# Patient Record
Sex: FEMALE | Race: WHITE | NOT HISPANIC OR LATINO
[De-identification: names, ages, dates, MRNs, and addresses within clinical notes are randomized per-mention and may not be internally consistent; named-entity substitution may affect disease eponyms.]

---

## 2021-03-24 ENCOUNTER — APPOINTMENT (OUTPATIENT)
Dept: OTOLARYNGOLOGY | Facility: CLINIC | Age: 71
End: 2021-03-24
Payer: MEDICARE

## 2021-03-24 VITALS
SYSTOLIC BLOOD PRESSURE: 145 MMHG | HEART RATE: 106 BPM | WEIGHT: 135 LBS | HEIGHT: 64 IN | DIASTOLIC BLOOD PRESSURE: 87 MMHG | BODY MASS INDEX: 23.05 KG/M2

## 2021-03-24 DIAGNOSIS — Z78.9 OTHER SPECIFIED HEALTH STATUS: ICD-10-CM

## 2021-03-24 DIAGNOSIS — Z82.49 FAMILY HISTORY OF ISCHEMIC HEART DISEASE AND OTHER DISEASES OF THE CIRCULATORY SYSTEM: ICD-10-CM

## 2021-03-24 DIAGNOSIS — Z80.1 FAMILY HISTORY OF MALIGNANT NEOPLASM OF TRACHEA, BRONCHUS AND LUNG: ICD-10-CM

## 2021-03-24 PROBLEM — Z00.00 ENCOUNTER FOR PREVENTIVE HEALTH EXAMINATION: Status: ACTIVE | Noted: 2021-03-24

## 2021-03-24 PROCEDURE — 99203 OFFICE O/P NEW LOW 30 MIN: CPT

## 2021-03-24 NOTE — REASON FOR VISIT
[Initial Evaluation] : an initial evaluation for [Other: _____] : [unfilled] [FreeTextEntry2] : left buccal mucosa

## 2021-03-24 NOTE — CONSULT LETTER
[Dear  ___] : Dear  [unfilled], [Consult Letter:] : I had the pleasure of evaluating your patient, [unfilled]. [Please see my note below.] : Please see my note below. [Sincerely,] : Sincerely, [FreeTextEntry2] : Nighat Ramirez DDS ([place], [place]) [FreeTextEntry3] : Yonis Hernández MD, FACS\par \par    [facility]\par Associate Chair\par    Department of Otolaryngology\par \par Professor\par Otolaryngology & Molecular Medicine\par [facility] [employer]\par

## 2021-03-24 NOTE — HISTORY OF PRESENT ILLNESS
[de-identified] : 70 year old female referred by Dr Nighat Ramirez for left buccal mucosa lesion\par hx left posterior buccal mucosa biopsy in 2016:, hyperkeratosis: Leukoplakia \par Denies pain, dysphagia, odynophagia, fevers, chills, recent throat infections. Weight has been stable. \par \par Pt feels present lesion L gum and cheek.  no bleeding or pain.  feels is inflammed. has been an issue in this specific area for quite some time that feels is worsening.

## 2021-03-24 NOTE — PHYSICAL EXAM
[FreeTextEntry1] : there is a sl raised red and white lesion L post buccal mandib gingiva w extesion of white plaque like lseion w ext across sulcus to invo,ve L post buccal mucosa w and additional denuded area mucosa posterior to this. None are fiable [Normal] : no rashes

## 2021-03-29 ENCOUNTER — OUTPATIENT (OUTPATIENT)
Dept: OUTPATIENT SERVICES | Facility: HOSPITAL | Age: 71
LOS: 1 days | End: 2021-03-29
Payer: MEDICARE

## 2021-03-29 VITALS
TEMPERATURE: 97 F | DIASTOLIC BLOOD PRESSURE: 80 MMHG | RESPIRATION RATE: 16 BRPM | HEART RATE: 87 BPM | OXYGEN SATURATION: 99 % | HEIGHT: 64 IN | SYSTOLIC BLOOD PRESSURE: 130 MMHG | WEIGHT: 138.01 LBS

## 2021-03-29 DIAGNOSIS — K13.70 UNSPECIFIED LESIONS OF ORAL MUCOSA: ICD-10-CM

## 2021-03-29 DIAGNOSIS — Z98.890 OTHER SPECIFIED POSTPROCEDURAL STATES: Chronic | ICD-10-CM

## 2021-03-29 DIAGNOSIS — Z01.818 ENCOUNTER FOR OTHER PREPROCEDURAL EXAMINATION: ICD-10-CM

## 2021-03-29 LAB
ANION GAP SERPL CALC-SCNC: 10 MMOL/L — SIGNIFICANT CHANGE UP (ref 7–14)
BUN SERPL-MCNC: 13 MG/DL — SIGNIFICANT CHANGE UP (ref 7–23)
CALCIUM SERPL-MCNC: 9.2 MG/DL — SIGNIFICANT CHANGE UP (ref 8.4–10.5)
CHLORIDE SERPL-SCNC: 104 MMOL/L — SIGNIFICANT CHANGE UP (ref 98–107)
CO2 SERPL-SCNC: 27 MMOL/L — SIGNIFICANT CHANGE UP (ref 22–31)
CREAT SERPL-MCNC: 0.77 MG/DL — SIGNIFICANT CHANGE UP (ref 0.5–1.3)
GLUCOSE SERPL-MCNC: 91 MG/DL — SIGNIFICANT CHANGE UP (ref 70–99)
HCT VFR BLD CALC: 44.1 % — SIGNIFICANT CHANGE UP (ref 34.5–45)
HGB BLD-MCNC: 14.6 G/DL — SIGNIFICANT CHANGE UP (ref 11.5–15.5)
MCHC RBC-ENTMCNC: 31.9 PG — SIGNIFICANT CHANGE UP (ref 27–34)
MCHC RBC-ENTMCNC: 33.1 GM/DL — SIGNIFICANT CHANGE UP (ref 32–36)
MCV RBC AUTO: 96.3 FL — SIGNIFICANT CHANGE UP (ref 80–100)
NRBC # BLD: 0 /100 WBCS — SIGNIFICANT CHANGE UP
NRBC # FLD: 0 K/UL — SIGNIFICANT CHANGE UP
PLATELET # BLD AUTO: 220 K/UL — SIGNIFICANT CHANGE UP (ref 150–400)
POTASSIUM SERPL-MCNC: 4.4 MMOL/L — SIGNIFICANT CHANGE UP (ref 3.5–5.3)
POTASSIUM SERPL-SCNC: 4.4 MMOL/L — SIGNIFICANT CHANGE UP (ref 3.5–5.3)
RBC # BLD: 4.58 M/UL — SIGNIFICANT CHANGE UP (ref 3.8–5.2)
RBC # FLD: 12 % — SIGNIFICANT CHANGE UP (ref 10.3–14.5)
SODIUM SERPL-SCNC: 141 MMOL/L — SIGNIFICANT CHANGE UP (ref 135–145)
WBC # BLD: 3.36 K/UL — LOW (ref 3.8–10.5)
WBC # FLD AUTO: 3.36 K/UL — LOW (ref 3.8–10.5)

## 2021-03-29 PROCEDURE — 93010 ELECTROCARDIOGRAM REPORT: CPT

## 2021-03-29 RX ORDER — SODIUM CHLORIDE 9 MG/ML
1000 INJECTION, SOLUTION INTRAVENOUS
Refills: 0 | Status: DISCONTINUED | OUTPATIENT
Start: 2021-04-02 | End: 2021-04-17

## 2021-03-29 RX ORDER — SODIUM CHLORIDE 9 MG/ML
3 INJECTION INTRAMUSCULAR; INTRAVENOUS; SUBCUTANEOUS ONCE
Refills: 0 | Status: DISCONTINUED | OUTPATIENT
Start: 2021-04-02 | End: 2021-04-17

## 2021-03-29 NOTE — H&P PST ADULT - NSICDXPROBLEM_GEN_ALL_CORE_FT
PROBLEM DIAGNOSES  Problem: Unspecified lesions of oral mucosa  Assessment and Plan: Pt scheduled for surgery on 4/2/2021.  Pre-op instructions provided. Pt verbalized understanding.   Pepcid provided for GI prophylaxis.   Pt states she is already scheduled for preop COVID testing.   Pt is going for medical clearance per surgeon's request.   Will request copy of last echo.

## 2021-03-29 NOTE — H&P PST ADULT - NSICDXFAMILYHX_GEN_ALL_CORE_FT
FAMILY HISTORY:  Father  Still living? Unknown  FH: lung cancer, Age at diagnosis: Age Unknown    Sibling  Still living? Unknown  FH: lung cancer, Age at diagnosis: Age Unknown

## 2021-03-29 NOTE — H&P PST ADULT - HISTORY OF PRESENT ILLNESS
70 year old female with c/o inflammation on her left lower gums which she has had for several years, had biopsy in 2016 but states it has gotten worse over the past year. Pt presents today for presurgical evaluation for ... 70 year old female with c/o inflammation on her left lower gums which she has had for several years, had biopsy in 2016 but states it has gotten worse over the past year. Pt presents today for presurgical evaluation for Biopsy Vestibule of Mouth.

## 2021-03-29 NOTE — H&P PST ADULT - NSICDXPASTMEDICALHX_GEN_ALL_CORE_FT
PAST MEDICAL HISTORY:  Nonrheumatic aortic valve disorder      PAST MEDICAL HISTORY:  H/O varicose veins     Nonrheumatic aortic valve disorder

## 2021-03-30 ENCOUNTER — APPOINTMENT (OUTPATIENT)
Dept: DISASTER EMERGENCY | Facility: CLINIC | Age: 71
End: 2021-03-30

## 2021-03-30 NOTE — H&P PST ADULT - NS PRO FEM REPRO HEALTH SCREEN
Headache    A headache is pain or discomfort felt around the head or neck area. The specific cause of a headache may not be found as there are many types including tension headaches, migraine headaches, and cluster headaches. Watch your condition for any changes. Things you can do to manage your pain include taking over the counter and prescription medications as instructed by your health care provider, lying down in a dark quiet room, limiting stress, getting regular sleep, and refraining from alcohol and tobacco products.    SEEK IMMEDIATE MEDICAL CARE IF YOU HAVE ANY OF THE FOLLOWING SYMPTOMS: fever, vomiting, stiff neck, loss of vision, problems with speech, muscle weakness, loss of balance, trouble walking, passing out, or confusion. mammogram

## 2021-03-31 LAB — SARS-COV-2 N GENE NPH QL NAA+PROBE: NOT DETECTED

## 2021-04-01 ENCOUNTER — TRANSCRIPTION ENCOUNTER (OUTPATIENT)
Age: 71
End: 2021-04-01

## 2021-04-02 ENCOUNTER — RESULT REVIEW (OUTPATIENT)
Age: 71
End: 2021-04-02

## 2021-04-02 ENCOUNTER — APPOINTMENT (OUTPATIENT)
Dept: OTOLARYNGOLOGY | Facility: HOSPITAL | Age: 71
End: 2021-04-02

## 2021-04-02 ENCOUNTER — OUTPATIENT (OUTPATIENT)
Dept: OUTPATIENT SERVICES | Facility: HOSPITAL | Age: 71
LOS: 1 days | Discharge: HOME | End: 2021-04-02
Payer: MEDICARE

## 2021-04-02 VITALS
HEART RATE: 98 BPM | TEMPERATURE: 99 F | HEIGHT: 64 IN | DIASTOLIC BLOOD PRESSURE: 77 MMHG | OXYGEN SATURATION: 100 % | WEIGHT: 138.01 LBS | SYSTOLIC BLOOD PRESSURE: 154 MMHG | RESPIRATION RATE: 16 BRPM

## 2021-04-02 VITALS
RESPIRATION RATE: 16 BRPM | DIASTOLIC BLOOD PRESSURE: 61 MMHG | OXYGEN SATURATION: 100 % | SYSTOLIC BLOOD PRESSURE: 105 MMHG | HEART RATE: 90 BPM

## 2021-04-02 DIAGNOSIS — Z98.890 OTHER SPECIFIED POSTPROCEDURAL STATES: Chronic | ICD-10-CM

## 2021-04-02 DIAGNOSIS — K13.70 UNSPECIFIED LESIONS OF ORAL MUCOSA: ICD-10-CM

## 2021-04-02 PROCEDURE — 88305 TISSUE EXAM BY PATHOLOGIST: CPT | Mod: 26

## 2021-04-02 PROCEDURE — 40808 BIOPSY OF MOUTH LESION: CPT | Mod: GC,LT

## 2021-04-02 RX ORDER — ONDANSETRON 8 MG/1
4 TABLET, FILM COATED ORAL ONCE
Refills: 0 | Status: DISCONTINUED | OUTPATIENT
Start: 2021-04-02 | End: 2021-04-17

## 2021-04-02 RX ORDER — FENTANYL CITRATE 50 UG/ML
50 INJECTION INTRAVENOUS
Refills: 0 | Status: DISCONTINUED | OUTPATIENT
Start: 2021-04-02 | End: 2021-04-02

## 2021-04-02 RX ORDER — OXYCODONE HYDROCHLORIDE 5 MG/1
5 TABLET ORAL ONCE
Refills: 0 | Status: DISCONTINUED | OUTPATIENT
Start: 2021-04-02 | End: 2021-04-02

## 2021-04-02 RX ORDER — OXYCODONE HYDROCHLORIDE 5 MG/1
5 TABLET ORAL EVERY 6 HOURS
Refills: 0 | Status: DISCONTINUED | OUTPATIENT
Start: 2021-04-02 | End: 2021-04-02

## 2021-04-02 RX ORDER — ACETAMINOPHEN 500 MG
650 TABLET ORAL EVERY 6 HOURS
Refills: 0 | Status: DISCONTINUED | OUTPATIENT
Start: 2021-04-02 | End: 2021-04-17

## 2021-04-02 RX ORDER — OXYCODONE AND ACETAMINOPHEN 5; 325 MG/1; MG/1
1 TABLET ORAL
Qty: 10 | Refills: 0
Start: 2021-04-02 | End: 2021-04-06

## 2021-04-02 RX ORDER — ONDANSETRON 8 MG/1
4 TABLET, FILM COATED ORAL EVERY 4 HOURS
Refills: 0 | Status: DISCONTINUED | OUTPATIENT
Start: 2021-04-02 | End: 2021-04-17

## 2021-04-02 RX ORDER — HYDROMORPHONE HYDROCHLORIDE 2 MG/ML
0.5 INJECTION INTRAMUSCULAR; INTRAVENOUS; SUBCUTANEOUS EVERY 4 HOURS
Refills: 0 | Status: DISCONTINUED | OUTPATIENT
Start: 2021-04-02 | End: 2021-04-02

## 2021-04-02 RX ORDER — FENTANYL CITRATE 50 UG/ML
25 INJECTION INTRAVENOUS
Refills: 0 | Status: DISCONTINUED | OUTPATIENT
Start: 2021-04-02 | End: 2021-04-02

## 2021-04-02 RX ORDER — OXYCODONE HYDROCHLORIDE 5 MG/1
10 TABLET ORAL ONCE
Refills: 0 | Status: DISCONTINUED | OUTPATIENT
Start: 2021-04-02 | End: 2021-04-02

## 2021-04-02 NOTE — ASU DISCHARGE PLAN (ADULT/PEDIATRIC) - CARE PROVIDER_API CALL
Yonis Hernández)  [facility]; Otolaryngology  [address] [place] [id number]  Phone: [phone]  Fax: [phone]  Follow Up Time:

## 2021-04-02 NOTE — ASU DISCHARGE PLAN (ADULT/PEDIATRIC) - ASU DC SPECIAL INSTRUCTIONSFT
Activity: No heavy lifting or strenuous activity. Walk as tolerated. Physical therapy per routine. Sutures in mouth are dissolvable.   Follow up 1 - 2 weeks after discharge. Call office for appointment.  Office: [phone].  Call office for fever >101.5 or redness or drainage from wound.

## 2021-04-02 NOTE — ASU DISCHARGE PLAN (ADULT/PEDIATRIC) - NURSING INSTRUCTIONS
You received IV Tylenol for pain management at 4:00PM. Please DO NOT take any Tylenol (Acetaminophen) containing products, such as Vicodin, Percocet, Excedrin, and cold medications for the next 6 hours (until 10:00 PM). DO NOT TAKE MORE THAN 3000 MG OF TYLENOL in a 24 hour period.

## 2021-04-05 PROBLEM — Z86.79 PERSONAL HISTORY OF OTHER DISEASES OF THE CIRCULATORY SYSTEM: Chronic | Status: ACTIVE | Noted: 2021-03-29

## 2021-04-07 LAB — SURGICAL PATHOLOGY STUDY: SIGNIFICANT CHANGE UP

## 2021-04-08 ENCOUNTER — APPOINTMENT (OUTPATIENT)
Dept: OTOLARYNGOLOGY | Facility: CLINIC | Age: 71
End: 2021-04-08

## 2021-04-09 ENCOUNTER — APPOINTMENT (OUTPATIENT)
Dept: CT IMAGING | Facility: CLINIC | Age: 71
End: 2021-04-09
Payer: MEDICARE

## 2021-04-09 ENCOUNTER — OUTPATIENT (OUTPATIENT)
Dept: OUTPATIENT SERVICES | Facility: HOSPITAL | Age: 71
LOS: 1 days | End: 2021-04-09
Payer: MEDICARE

## 2021-04-09 DIAGNOSIS — K13.70 UNSPECIFIED LESIONS OF ORAL MUCOSA: ICD-10-CM

## 2021-04-09 DIAGNOSIS — Z98.890 OTHER SPECIFIED POSTPROCEDURAL STATES: Chronic | ICD-10-CM

## 2021-04-09 PROCEDURE — 70491 CT SOFT TISSUE NECK W/DYE: CPT

## 2021-04-09 PROCEDURE — 70491 CT SOFT TISSUE NECK W/DYE: CPT | Mod: 26,MH

## 2021-04-13 ENCOUNTER — APPOINTMENT (OUTPATIENT)
Dept: OTOLARYNGOLOGY | Facility: CLINIC | Age: 71
End: 2021-04-13
Payer: MEDICARE

## 2021-04-13 VITALS
SYSTOLIC BLOOD PRESSURE: 166 MMHG | HEART RATE: 116 BPM | HEIGHT: 64 IN | WEIGHT: 135 LBS | DIASTOLIC BLOOD PRESSURE: 77 MMHG | BODY MASS INDEX: 23.05 KG/M2

## 2021-04-13 PROCEDURE — 99215 OFFICE O/P EST HI 40 MIN: CPT

## 2021-04-13 PROCEDURE — 99072 ADDL SUPL MATRL&STAF TM PHE: CPT

## 2021-04-13 NOTE — CONSULT LETTER
[Dear  ___] : Dear  [unfilled], [Courtesy Letter:] : I had the pleasure of seeing your patient, [unfilled], in my office today. [Please see my note below.] : Please see my note below. [Sincerely,] : Sincerely, [FreeTextEntry2] : Nighat Ramirez DDS ([place], [place]) [FreeTextEntry3] : Yonis Hernández MD, FACS\par \par    [facility]\par Associate Chair\par    Department of Otolaryngology\par \par Professor\par Otolaryngology & Molecular Medicine\par [facility] [employer]\par

## 2021-04-13 NOTE — REASON FOR VISIT
[Subsequent Evaluation] : a subsequent evaluation for [FreeTextEntry2] : s/p left mandibular and buccal biopsies on 4/2/2021

## 2021-04-13 NOTE — HISTORY OF PRESENT ILLNESS
[de-identified] : Ms. Estrella is s/p left mandibular and buccal biopsies on 4/2/2021. 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

## 2021-04-14 NOTE — H&P PST ADULT - GASTROINTESTINAL
Assessment/Plan:      Diagnoses and all orders for this visit:    Bartholin gland cyst    Other orders  -     Discontinue: terbinafine (LamISIL) 250 mg tablet; Take 250 mg by mouth daily      -reviewed with patient right-sided bartholin cyst   Reviewed conservative measures, I&D  Patient desires I&D today-see procedure note    RTO annual exam     Subjective:     Patient ID: Francesca Batista is a 42 y o  female  HPI  here with complaints of vaginal lump for approximately 4-5 days  Complains of slight pain in pressure in that area  Aggravating factors include wearing clothing, sitting, toileting  Denies alleviating factors  Has not tried any over-the-counter remedies  Last Pap smear 16 resulted NILM HR HPV(-)  Last mammogram 21 resulted BI-RADS 3 left/BI-RADS 1 right  Mirena IUD placed 17 effective 2022 (used for AUB)    Review of Systems   Constitutional: Negative for chills, fatigue and fever  Respiratory: Negative  Cardiovascular: Negative  Objective:     Physical Exam  Constitutional:       Appearance: Normal appearance  Cardiovascular:      Rate and Rhythm: Normal rate and regular rhythm  Pulmonary:      Effort: Pulmonary effort is normal       Breath sounds: Normal breath sounds  Neurological:      Mental Status: She is alert and oriented to person, place, and time     Psychiatric:         Mood and Affect: Mood normal          Behavior: Behavior normal  details… detailed exam

## 2021-04-16 ENCOUNTER — OUTPATIENT (OUTPATIENT)
Dept: OUTPATIENT SERVICES | Facility: HOSPITAL | Age: 71
LOS: 1 days | End: 2021-04-16
Payer: MEDICARE

## 2021-04-16 ENCOUNTER — APPOINTMENT (OUTPATIENT)
Dept: CT IMAGING | Facility: CLINIC | Age: 71
End: 2021-04-16
Payer: MEDICARE

## 2021-04-16 DIAGNOSIS — Z98.890 OTHER SPECIFIED POSTPROCEDURAL STATES: Chronic | ICD-10-CM

## 2021-04-16 DIAGNOSIS — C03.9 MALIGNANT NEOPLASM OF GUM, UNSPECIFIED: ICD-10-CM

## 2021-04-16 PROCEDURE — 71250 CT THORAX DX C-: CPT

## 2021-04-16 PROCEDURE — 71250 CT THORAX DX C-: CPT | Mod: 26,MH

## 2021-04-20 ENCOUNTER — FORM ENCOUNTER (OUTPATIENT)
Age: 71
End: 2021-04-20

## 2021-04-20 ENCOUNTER — APPOINTMENT (OUTPATIENT)
Dept: OTOLARYNGOLOGY | Facility: CLINIC | Age: 71
End: 2021-04-20

## 2021-04-21 ENCOUNTER — NON-APPOINTMENT (OUTPATIENT)
Age: 71
End: 2021-04-21

## 2021-04-23 ENCOUNTER — OUTPATIENT (OUTPATIENT)
Dept: OUTPATIENT SERVICES | Facility: HOSPITAL | Age: 71
LOS: 1 days | End: 2021-04-23
Payer: MEDICARE

## 2021-04-23 VITALS
DIASTOLIC BLOOD PRESSURE: 86 MMHG | SYSTOLIC BLOOD PRESSURE: 132 MMHG | WEIGHT: 134.04 LBS | RESPIRATION RATE: 14 BRPM | TEMPERATURE: 98 F | OXYGEN SATURATION: 97 % | HEART RATE: 72 BPM | HEIGHT: 64 IN

## 2021-04-23 DIAGNOSIS — Z98.890 OTHER SPECIFIED POSTPROCEDURAL STATES: Chronic | ICD-10-CM

## 2021-04-23 DIAGNOSIS — C79.89 SECONDARY MALIGNANT NEOPLASM OF OTHER SPECIFIED SITES: ICD-10-CM

## 2021-04-23 DIAGNOSIS — C03.9 MALIGNANT NEOPLASM OF GUM, UNSPECIFIED: ICD-10-CM

## 2021-04-23 DIAGNOSIS — C03.9 MALIGNANT NEOPLASM OF GUM, UNSPECIFIED: Chronic | ICD-10-CM

## 2021-04-23 LAB
ALBUMIN SERPL ELPH-MCNC: 4.4 G/DL — SIGNIFICANT CHANGE UP (ref 3.3–5)
ALP SERPL-CCNC: 67 U/L — SIGNIFICANT CHANGE UP (ref 40–120)
ALT FLD-CCNC: 16 U/L — SIGNIFICANT CHANGE UP (ref 4–33)
ANION GAP SERPL CALC-SCNC: 9 MMOL/L — SIGNIFICANT CHANGE UP (ref 7–14)
AST SERPL-CCNC: 20 U/L — SIGNIFICANT CHANGE UP (ref 4–32)
BILIRUB SERPL-MCNC: 0.6 MG/DL — SIGNIFICANT CHANGE UP (ref 0.2–1.2)
BLD GP AB SCN SERPL QL: NEGATIVE — SIGNIFICANT CHANGE UP
BUN SERPL-MCNC: 13 MG/DL — SIGNIFICANT CHANGE UP (ref 7–23)
CALCIUM SERPL-MCNC: 9.9 MG/DL — SIGNIFICANT CHANGE UP (ref 8.4–10.5)
CHLORIDE SERPL-SCNC: 105 MMOL/L — SIGNIFICANT CHANGE UP (ref 98–107)
CO2 SERPL-SCNC: 27 MMOL/L — SIGNIFICANT CHANGE UP (ref 22–31)
CREAT SERPL-MCNC: 0.81 MG/DL — SIGNIFICANT CHANGE UP (ref 0.5–1.3)
GLUCOSE SERPL-MCNC: 79 MG/DL — SIGNIFICANT CHANGE UP (ref 70–99)
HCT VFR BLD CALC: 45.3 % — HIGH (ref 34.5–45)
HGB BLD-MCNC: 15.1 G/DL — SIGNIFICANT CHANGE UP (ref 11.5–15.5)
MCHC RBC-ENTMCNC: 31.8 PG — SIGNIFICANT CHANGE UP (ref 27–34)
MCHC RBC-ENTMCNC: 33.3 GM/DL — SIGNIFICANT CHANGE UP (ref 32–36)
MCV RBC AUTO: 95.4 FL — SIGNIFICANT CHANGE UP (ref 80–100)
NRBC # BLD: 0 /100 WBCS — SIGNIFICANT CHANGE UP
NRBC # FLD: 0 K/UL — SIGNIFICANT CHANGE UP
PLATELET # BLD AUTO: 240 K/UL — SIGNIFICANT CHANGE UP (ref 150–400)
POTASSIUM SERPL-MCNC: 5.1 MMOL/L — SIGNIFICANT CHANGE UP (ref 3.5–5.3)
POTASSIUM SERPL-SCNC: 5.1 MMOL/L — SIGNIFICANT CHANGE UP (ref 3.5–5.3)
PROT SERPL-MCNC: 7 G/DL — SIGNIFICANT CHANGE UP (ref 6–8.3)
RBC # BLD: 4.75 M/UL — SIGNIFICANT CHANGE UP (ref 3.8–5.2)
RBC # FLD: 12 % — SIGNIFICANT CHANGE UP (ref 10.3–14.5)
RH IG SCN BLD-IMP: POSITIVE — SIGNIFICANT CHANGE UP
SODIUM SERPL-SCNC: 141 MMOL/L — SIGNIFICANT CHANGE UP (ref 135–145)
WBC # BLD: 3.59 K/UL — LOW (ref 3.8–10.5)
WBC # FLD AUTO: 3.59 K/UL — LOW (ref 3.8–10.5)

## 2021-04-23 PROCEDURE — 93010 ELECTROCARDIOGRAM REPORT: CPT

## 2021-04-23 RX ORDER — SODIUM CHLORIDE 9 MG/ML
1000 INJECTION, SOLUTION INTRAVENOUS
Refills: 0 | Status: DISCONTINUED | OUTPATIENT
Start: 2021-05-06 | End: 2021-05-08

## 2021-04-23 NOTE — H&P PST ADULT - MARITAL STATUS
2 children, mother  84y/o heart problems, father  51y/o lung cancer, 4 siblings 1 brother  70y/o lung cancer/

## 2021-04-23 NOTE — H&P PST ADULT - SKIN/BREAST
What Is The Reason For Today's Visit?: Full Body Skin Examination What Is The Reason For Today's Visit? (Being Monitored For X): the development of a new lesion details…

## 2021-04-23 NOTE — H&P PST ADULT - MUSCULOSKELETAL
detailed exam ROM intact/no joint swelling/no joint erythema/no joint warmth/no calf tenderness/normal strength details… ROM intact/no joint swelling/normal strength

## 2021-04-23 NOTE — H&P PST ADULT - RS GEN PE MLT RESP DETAILS PC
airway patent/breath sounds equal/good air movement/respirations non-labored/clear to auscultation bilaterally breath sounds equal/good air movement/respirations non-labored/clear to auscultation bilaterally/no chest wall tenderness/no intercostal retractions/no rales/no rhonchi/no wheezes

## 2021-04-23 NOTE — H&P PST ADULT - NSICDXPASTMEDICALHX_GEN_ALL_CORE_FT
PAST MEDICAL HISTORY:  H/O varicose veins     Nonrheumatic aortic valve disorder      PAST MEDICAL HISTORY:  Aortic insufficiency     H/O varicose veins     Malignant neoplasm of gingiva

## 2021-04-23 NOTE — H&P PST ADULT - HISTORY OF PRESENT ILLNESS
70 year old female with c/o inflammation on her left lower gums which she has had for several years, had biopsy in 2016 but states it has gotten worse over the past year. Pt presents today for presurgical evaluation for Biopsy Vestibule of Mouth.  69y/o female scheduled for glossectomy left neck dissection tracheostomy, isabella marginal reconstruction plate possible implant placment X3 on 5/6/2021.  Pt states, "has inflammation of left lower gum for several yrs, worsening over time, denies pain.   S/p biopsy 3/2021 positive for malignancy."

## 2021-04-23 NOTE — H&P PST ADULT - NSICDXPROBLEM_GEN_ALL_CORE_FT
PROBLEM DIAGNOSES  Problem: Malignant neoplasm of gum  Assessment and Plan: Pt scheduled for glossectomy left neck dissection tracheostomy, mell marginal mandibulectomy reconstruction plate possible implant on 5/6/2021.  labs done results pending, ekg done.  Pt scheduled for preop covid testing.  Preop teaching done, pt able to verbalize understanding.   Pt scheduled for medical eval as per surgeon, pst will also request

## 2021-04-26 ENCOUNTER — APPOINTMENT (OUTPATIENT)
Dept: OTOLARYNGOLOGY | Facility: CLINIC | Age: 71
End: 2021-04-26
Payer: MEDICARE

## 2021-04-26 VITALS
WEIGHT: 138 LBS | TEMPERATURE: 97.8 F | HEART RATE: 77 BPM | DIASTOLIC BLOOD PRESSURE: 83 MMHG | SYSTOLIC BLOOD PRESSURE: 148 MMHG | OXYGEN SATURATION: 99 % | BODY MASS INDEX: 23.56 KG/M2 | HEIGHT: 64 IN

## 2021-04-26 DIAGNOSIS — J39.2 OTHER DISEASES OF PHARYNX: ICD-10-CM

## 2021-04-26 PROCEDURE — 31575 DIAGNOSTIC LARYNGOSCOPY: CPT

## 2021-04-26 PROCEDURE — 99072 ADDL SUPL MATRL&STAF TM PHE: CPT

## 2021-04-26 PROCEDURE — 99215 OFFICE O/P EST HI 40 MIN: CPT | Mod: 25

## 2021-04-26 NOTE — PROCEDURE
[Lesion] : lesion identified by mirror examination needing further evaluation [None] : none [Flexible Endoscope] : examined with the flexible endoscope [Serial Number: ___] : Serial Number: [unfilled] [de-identified] : No lesions in the NPx, HPx or larynx.  Exam of the OPx with asymmetric prominence of the L. BOT - an obvious cyst is not seen - with what seems to be lingual tonsillar hypertrophy.  VC are mobile, airway patent.\par

## 2021-04-26 NOTE — HISTORY OF PRESENT ILLNESS
[de-identified] : Ms. Cary  is refer by Dr. Hernández with newly diagnosis of left mandibular/buccal biopsies showed invasive SCCa . pt is here to discuss reconstruction surgery. pt already saw Dr. Vega. pt has the lesion about one year and now start irritation. no bleeding, no pain.\par never smoke.

## 2021-04-26 NOTE — CONSULT LETTER
[Dear  ___] : Dear  [unfilled], [Courtesy Letter:] : I had the pleasure of seeing your patient, [unfilled], in my office today. [Please see my note below.] : Please see my note below. [Consult Closing:] : Thank you very much for allowing me to participate in the care of this patient.  If you have any questions, please do not hesitate to contact me. [Sincerely,] : Sincerely, [FreeTextEntry2] : Nighat Ramirez DDS ([place], [place])  [FreeTextEntry3] : Dev\par \par William Dill MD FACS\par Division of Head and Neck Surgery\par Department of Otolaryngology \par [facility]\par \par  of Otolaryngology\par Assistant Professor of Surgery\par [employer] [employer] at [facility]

## 2021-04-26 NOTE — PHYSICAL EXAM
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  The L. BOT feels normal - not firm.  [de-identified] : Slava's test positive L. hand.

## 2021-04-29 ENCOUNTER — APPOINTMENT (OUTPATIENT)
Dept: SPEECH THERAPY | Facility: CLINIC | Age: 71
End: 2021-04-29

## 2021-04-29 ENCOUNTER — OUTPATIENT (OUTPATIENT)
Dept: OUTPATIENT SERVICES | Facility: HOSPITAL | Age: 71
LOS: 1 days | Discharge: HOME | End: 2021-04-29
Payer: MEDICARE

## 2021-04-29 DIAGNOSIS — Z98.890 OTHER SPECIFIED POSTPROCEDURAL STATES: Chronic | ICD-10-CM

## 2021-05-03 ENCOUNTER — APPOINTMENT (OUTPATIENT)
Dept: DISASTER EMERGENCY | Facility: CLINIC | Age: 71
End: 2021-05-03

## 2021-05-03 PROBLEM — I35.1 NONRHEUMATIC AORTIC (VALVE) INSUFFICIENCY: Chronic | Status: ACTIVE | Noted: 2021-04-23

## 2021-05-03 LAB — SARS-COV-2 N GENE NPH QL NAA+PROBE: NOT DETECTED

## 2021-05-04 ENCOUNTER — APPOINTMENT (OUTPATIENT)
Dept: PLASTIC SURGERY | Facility: CLINIC | Age: 71
End: 2021-05-04

## 2021-05-05 ENCOUNTER — TRANSCRIPTION ENCOUNTER (OUTPATIENT)
Age: 71
End: 2021-05-05

## 2021-05-06 ENCOUNTER — APPOINTMENT (OUTPATIENT)
Dept: OTOLARYNGOLOGY | Facility: HOSPITAL | Age: 71
End: 2021-05-06

## 2021-05-06 ENCOUNTER — RESULT REVIEW (OUTPATIENT)
Age: 71
End: 2021-05-06

## 2021-05-06 ENCOUNTER — INPATIENT (INPATIENT)
Facility: HOSPITAL | Age: 71
LOS: 6 days | Discharge: HOME | End: 2021-05-13
Attending: OTOLARYNGOLOGY | Admitting: DENTIST
Payer: MEDICARE

## 2021-05-06 VITALS
WEIGHT: 134.04 LBS | SYSTOLIC BLOOD PRESSURE: 141 MMHG | DIASTOLIC BLOOD PRESSURE: 79 MMHG | OXYGEN SATURATION: 98 % | TEMPERATURE: 98 F | HEART RATE: 94 BPM | HEIGHT: 64 IN | RESPIRATION RATE: 15 BRPM

## 2021-05-06 DIAGNOSIS — Z98.890 OTHER SPECIFIED POSTPROCEDURAL STATES: Chronic | ICD-10-CM

## 2021-05-06 DIAGNOSIS — C03.9 MALIGNANT NEOPLASM OF GUM, UNSPECIFIED: ICD-10-CM

## 2021-05-06 LAB
ANION GAP SERPL CALC-SCNC: 11 MMOL/L — SIGNIFICANT CHANGE UP (ref 7–14)
APTT BLD: 27.5 SEC — SIGNIFICANT CHANGE UP (ref 27–36.3)
BLOOD GAS ARTERIAL COMPREHENSIVE RESULT: SIGNIFICANT CHANGE UP
BUN SERPL-MCNC: 9 MG/DL — SIGNIFICANT CHANGE UP (ref 7–23)
CA-I BLD-SCNC: 1.03 MMOL/L — LOW (ref 1.15–1.29)
CALCIUM SERPL-MCNC: 7.7 MG/DL — LOW (ref 8.4–10.5)
CHLORIDE SERPL-SCNC: 104 MMOL/L — SIGNIFICANT CHANGE UP (ref 98–107)
CO2 SERPL-SCNC: 24 MMOL/L — SIGNIFICANT CHANGE UP (ref 22–31)
CREAT SERPL-MCNC: 0.73 MG/DL — SIGNIFICANT CHANGE UP (ref 0.5–1.3)
GAS PNL BLDA: SIGNIFICANT CHANGE UP
GAS PNL BLDA: SIGNIFICANT CHANGE UP
GLUCOSE SERPL-MCNC: 176 MG/DL — HIGH (ref 70–99)
HCT VFR BLD CALC: 31.9 % — LOW (ref 34.5–45)
HGB BLD-MCNC: 10.5 G/DL — LOW (ref 11.5–15.5)
INR BLD: 1.18 RATIO — HIGH (ref 0.88–1.16)
MAGNESIUM SERPL-MCNC: 1.5 MG/DL — LOW (ref 1.6–2.6)
MCHC RBC-ENTMCNC: 31.8 PG — SIGNIFICANT CHANGE UP (ref 27–34)
MCHC RBC-ENTMCNC: 32.9 GM/DL — SIGNIFICANT CHANGE UP (ref 32–36)
MCV RBC AUTO: 96.7 FL — SIGNIFICANT CHANGE UP (ref 80–100)
NRBC # BLD: 0 /100 WBCS — SIGNIFICANT CHANGE UP
NRBC # FLD: 0 K/UL — SIGNIFICANT CHANGE UP
PHOSPHATE SERPL-MCNC: 3.7 MG/DL — SIGNIFICANT CHANGE UP (ref 2.5–4.5)
PLATELET # BLD AUTO: 156 K/UL — SIGNIFICANT CHANGE UP (ref 150–400)
POTASSIUM SERPL-MCNC: 4 MMOL/L — SIGNIFICANT CHANGE UP (ref 3.5–5.3)
POTASSIUM SERPL-SCNC: 4 MMOL/L — SIGNIFICANT CHANGE UP (ref 3.5–5.3)
PROTHROM AB SERPL-ACNC: 13.5 SEC — SIGNIFICANT CHANGE UP (ref 10.6–13.6)
RBC # BLD: 3.3 M/UL — LOW (ref 3.8–5.2)
RBC # FLD: 12.3 % — SIGNIFICANT CHANGE UP (ref 10.3–14.5)
SODIUM SERPL-SCNC: 139 MMOL/L — SIGNIFICANT CHANGE UP (ref 135–145)
WBC # BLD: 10.82 K/UL — HIGH (ref 3.8–10.5)
WBC # FLD AUTO: 10.82 K/UL — HIGH (ref 3.8–10.5)

## 2021-05-06 PROCEDURE — 38724 REMOVAL OF LYMPH NODES NECK: CPT | Mod: GC,LT,59

## 2021-05-06 PROCEDURE — 14021 TIS TRNFR S/A/L 10.1-30 SQCM: CPT | Mod: GC

## 2021-05-06 PROCEDURE — 40842 RECONSTRUCTION OF MOUTH: CPT | Mod: GC

## 2021-05-06 PROCEDURE — 31600 PLANNED TRACHEOSTOMY: CPT | Mod: GC

## 2021-05-06 PROCEDURE — 15757 FREE SKIN FLAP MICROVASC: CPT | Mod: GC

## 2021-05-06 PROCEDURE — 41150 TONGUE MOUTH JAW SURGERY: CPT | Mod: GC,LT

## 2021-05-06 PROCEDURE — 99222 1ST HOSP IP/OBS MODERATE 55: CPT | Mod: 25

## 2021-05-06 PROCEDURE — 88309 TISSUE EXAM BY PATHOLOGIST: CPT | Mod: 26

## 2021-05-06 PROCEDURE — 88332 PATH CONSLTJ SURG EA ADD BLK: CPT | Mod: 26

## 2021-05-06 PROCEDURE — 71045 X-RAY EXAM CHEST 1 VIEW: CPT | Mod: 26

## 2021-05-06 PROCEDURE — 88311 DECALCIFY TISSUE: CPT | Mod: 26

## 2021-05-06 PROCEDURE — 88331 PATH CONSLTJ SURG 1 BLK 1SPC: CPT | Mod: 26

## 2021-05-06 PROCEDURE — 88307 TISSUE EXAM BY PATHOLOGIST: CPT | Mod: 26

## 2021-05-06 RX ORDER — CALCIUM GLUCONATE 100 MG/ML
2 VIAL (ML) INTRAVENOUS
Refills: 0 | Status: COMPLETED | OUTPATIENT
Start: 2021-05-06 | End: 2021-05-06

## 2021-05-06 RX ORDER — MAGNESIUM SULFATE 500 MG/ML
2 VIAL (ML) INJECTION
Refills: 0 | Status: COMPLETED | OUTPATIENT
Start: 2021-05-06 | End: 2021-05-06

## 2021-05-06 RX ORDER — BACITRACIN ZINC 500 UNIT/G
1 OINTMENT IN PACKET (EA) TOPICAL
Refills: 0 | Status: DISCONTINUED | OUTPATIENT
Start: 2021-05-06 | End: 2021-05-13

## 2021-05-06 RX ORDER — ASPIRIN/CALCIUM CARB/MAGNESIUM 324 MG
300 TABLET ORAL ONCE
Refills: 0 | Status: COMPLETED | OUTPATIENT
Start: 2021-05-06 | End: 2021-05-06

## 2021-05-06 RX ORDER — AMPICILLIN SODIUM AND SULBACTAM SODIUM 250; 125 MG/ML; MG/ML
3 INJECTION, POWDER, FOR SUSPENSION INTRAMUSCULAR; INTRAVENOUS ONCE
Refills: 0 | Status: COMPLETED | OUTPATIENT
Start: 2021-05-06 | End: 2021-05-06

## 2021-05-06 RX ORDER — HYDROMORPHONE HYDROCHLORIDE 2 MG/ML
0.5 INJECTION INTRAMUSCULAR; INTRAVENOUS; SUBCUTANEOUS EVERY 4 HOURS
Refills: 0 | Status: DISCONTINUED | OUTPATIENT
Start: 2021-05-06 | End: 2021-05-07

## 2021-05-06 RX ORDER — HYDROMORPHONE HYDROCHLORIDE 2 MG/ML
0.25 INJECTION INTRAMUSCULAR; INTRAVENOUS; SUBCUTANEOUS EVERY 4 HOURS
Refills: 0 | Status: DISCONTINUED | OUTPATIENT
Start: 2021-05-06 | End: 2021-05-07

## 2021-05-06 RX ORDER — ACETAMINOPHEN 500 MG
1000 TABLET ORAL EVERY 6 HOURS
Refills: 0 | Status: DISCONTINUED | OUTPATIENT
Start: 2021-05-06 | End: 2021-05-07

## 2021-05-06 RX ORDER — ENOXAPARIN SODIUM 100 MG/ML
40 INJECTION SUBCUTANEOUS DAILY
Refills: 0 | Status: DISCONTINUED | OUTPATIENT
Start: 2021-05-06 | End: 2021-05-13

## 2021-05-06 RX ORDER — ASPIRIN/CALCIUM CARB/MAGNESIUM 324 MG
81 TABLET ORAL DAILY
Refills: 0 | Status: DISCONTINUED | OUTPATIENT
Start: 2021-05-07 | End: 2021-05-13

## 2021-05-06 RX ORDER — AMPICILLIN SODIUM AND SULBACTAM SODIUM 250; 125 MG/ML; MG/ML
1.5 INJECTION, POWDER, FOR SUSPENSION INTRAMUSCULAR; INTRAVENOUS EVERY 6 HOURS
Refills: 0 | Status: DISCONTINUED | OUTPATIENT
Start: 2021-05-06 | End: 2021-05-12

## 2021-05-06 RX ADMIN — Medication 200 GRAM(S): at 21:53

## 2021-05-06 RX ADMIN — AMPICILLIN SODIUM AND SULBACTAM SODIUM 100 GRAM(S): 250; 125 INJECTION, POWDER, FOR SUSPENSION INTRAMUSCULAR; INTRAVENOUS at 23:39

## 2021-05-06 RX ADMIN — Medication 400 MILLIGRAM(S): at 23:43

## 2021-05-06 RX ADMIN — Medication 50 GRAM(S): at 21:53

## 2021-05-06 RX ADMIN — Medication 300 MILLIGRAM(S): at 20:17

## 2021-05-06 NOTE — H&P ADULT - NSHPSOCIALHISTORY_GEN_ALL_CORE
Social History:  · Marital Status	  2 children, mother  84y/o heart problems, father  51y/o lung cancer, 4 siblings 1 brother  68y/o lung cancer  · Occupation	retired- teacher  · Lives With	spouse     Substance Use History:  · Substance Use	caffeine  denies drug abuse  · Caffeine Type	coffee  · Caffeine Amount/Frequency	1-2 cups/cans per day     Alcohol Use History:  · Have you ever consumed alcohol	yes...  · Alcohol Type	wine  · Last Alcohol Use	05-Mar-2021  · Alcohol Frequency	2-4 times/mo  · Alcohol Amount	1-2 drinks  · 1. Have you felt you ought to CUT down on your drinking?	no  · 2. Have people ANNOYED you by criticizing your drinking?	no  · 3. Have you ever felt bad or GUILTY about your drinking?	no  · 4. Have you ever needed an "EYE OPENER", a drink first thing in the morning to steady your nerves or get rid of a hangover?	no     Tobacco Usage:  · Tobacco Usage: Never smoker     Passive Smoke Exposure:  · Passive Smoke Exposure	No

## 2021-05-06 NOTE — BRIEF OPERATIVE NOTE - OPERATION/FINDINGS
L marginal mandibulectomy, oral cavity resection, L SND 1-3, L radial forearm free flap
L radial forearm free flap to L buccal/marginal mandibular defect

## 2021-05-06 NOTE — CONSULT NOTE ADULT - ASSESSMENT
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      PLAN:    NEURO: acute post-op pain control, A&Ox3  - Tylenol ATC  - Dilaudid PRN    HEENT: s/p L composite resection, marginal mandibulectomy, placement of implants by OMFS, L SND 1-3, L radial forearm free flap 5/6  - q1h flap checks w/Cook doppler  - Monitor drain outputs: Neck JONAS x1, L forearm JONAS x1    RESPIRATORY: s/p tracheostomy 5/6  - Trach collar  - Maintain SAO2>92%    CARDIOVASCULAR:   - MAP goal >65    GI/NUTRITION:   - NPO, will start tube feeds POD1 per ENT/PRS  - CXR to confirm NGT placed in OR    GENITOURINARY/RENAL:  - LR@75  - Continue soares catheter  - Monitor I/Os  - Trend BUN/Cr daily  - Trend electrolytes, replete PRN    HEMATOLOGIC:   -  rectal suppository today, will start ASA 81 POD1  - Trend CBC, Coags daily  - VTE ppx: SCDs,     INFECTIOUS DISEASE:  - Unasyn for perioperative prophylaxis until drains are removed per ENT  - Trend WBC, monitor for signs of infection    ENDOCRINE:  - Monitor serum glucose on daily BMP    LINES/TUBES/DRAINS:  - PIVs  - Soares  - R Radial a-line  - NGT  - Tracheostomy  - Neck JONAS x1, L forearm JONAS x1    DISPO: SICU    -------------------------------------------------------------------------------------------------------------------  Critical Care Diagnoses: s/p tracheostomy requiring airway monitoring, s/p flap reconstruction requiring q1h flap checks    Nella Burden, PGY2  [id number] ASSESSMENT:  ROMERO MIMS is a 70y Female 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      PLAN:    NEURO: acute post-op pain control, A&Ox3  - Tylenol ATC  - Dilaudid PRN    HEENT: s/p L composite resection, marginal mandibulectomy, placement of implants by OMFS, L SND 1-3, L radial forearm free flap 5/6  - q1h flap checks w/Cook doppler  - Monitor drain outputs: Neck JONAS x1, L forearm JONAS x1    RESPIRATORY: s/p tracheostomy 5/6  - Trach collar  - Maintain SAO2>92%    CARDIOVASCULAR:   - MAP goal >65    GI/NUTRITION:   - NPO, will start tube feeds POD1 per ENT/PRS  - CXR to confirm NGT placed in OR  - Bowel regimen once starting tube feeds    GENITOURINARY/RENAL:  - LR@75  - Continue soares catheter  - Monitor I/Os  - Trend BUN/Cr daily  - Trend electrolytes, replete PRN    HEMATOLOGIC:   -  rectal suppository today, will start ASA 81 POD1  - Trend CBC, Coags daily  - VTE ppx: SCDs,     INFECTIOUS DISEASE:  - Unasyn for perioperative prophylaxis until drains are removed per ENT  - Bacitracin BID to neck and forearm incisions  - Trend WBC, monitor for signs of infection    ENDOCRINE:  - Monitor serum glucose on daily BMP    LINES/TUBES/DRAINS:  - PIVs  - Soares  - R Radial a-line  - NGT  - Tracheostomy  - Neck JONAS x1, L forearm JONAS x1    DISPO: SICU    -------------------------------------------------------------------------------------------------------------------  Critical Care Diagnoses: s/p tracheostomy requiring airway monitoring, s/p flap reconstruction requiring q1h flap checks    Nella Burden, PGY2  SICU [id number] none

## 2021-05-06 NOTE — PROGRESS NOTE ADULT - SUBJECTIVE AND OBJECTIVE BOX
69yo F with L alveolar ridge scca s/p L composite resection, marginal mandibulectomy, placement of implants by OMFS, L SND 1-3, L radial forearm free flap 5/6, recovering appropriately.   - unasyn 1.5 q6h x24 hours  - lovenox/asa  - routine trach care, can deflate cuff once off vent  - NGT - please obtain CXR to confirm placement  - can start feeds once placement confirmed  - dc soares once OOB  - mark per sicu  - JONAS left neck and left arm, to bulb suction  - flap checks per PRS  - doppler per PRS  - monitor fluid status  - home meds  - bowel regimen  - pain control  - AM labs  - HOBE

## 2021-05-06 NOTE — H&P ADULT - NSHPREVIEWOFSYSTEMS_GEN_ALL_CORE
Review of Systems:   · Negative General Symptoms	no fever; no chills; no sweating; no anorexia; no weight loss; no polyphagia; no polyuria; no polydipsia; no malaise; no fatigue  · General Symptoms	weight loss  · General Comments	lost 25 pounds over the past 1 1/2 yrs from dieting  · Negative Skin Symptoms	no rash; no itching; no dryness  · Negative Breast Symptoms	no breast tenderness L; no breast tenderness R; no breast lump L; no breast lump R; no nipple discharge R  · Ophthalmologic	negative  · Negative ENMT Symptoms	no hearing difficulty; no ear pain; no tinnitus; no vertigo; no sinus symptoms; no nasal congestion; no nasal discharge; no nasal obstruction; no post-nasal discharge; no nose bleeds; no recurrent cold sores; no abnormal taste sensation; no gum bleeding; no dry mouth; no throat pain; no dysphagia  · ENMT Comments	left lower gum cancer- denies pain  · Negative Respiratory and Thorax Symptoms	no wheezing; no dyspnea; no cough; no hemoptysis; no pleuritic chest pain  · Negative Cardiovascular Symptoms	no chest pain; no palpitations; no dyspnea on exertion; no orthopnea; no paroxysmal nocturnal dyspnea; no peripheral edema; no claudication  · Negative Gastrointestinal Symptoms	no nausea; no vomiting; no diarrhea; no constipation; no abdominal pain  · Negative General Genitourinary Symptoms	no hematuria; no flank pain L; no flank pain R; no bladder infections; no dysuria; normal urinary frequency  · Negative Female-Specific Symptoms	no abnormal vaginal bleeding; no pelvic pain  · Negative Musculoskeletal Symptoms	no neck pain  · Musculoskeletal Symptoms	arthritis; knees  · Neurological	negative  · Negative Psychiatric Symptoms	no suicidal ideation; no depression; no anxiety  · Negative Hematology Symptoms	no gum bleeding; no nose bleeding  · Negative Lymphatic Symptoms	no enlarged lymph nodes; no tender lymph nodes; no swelling of extremity  · Negative Endocrine Symptoms	no cold intolerance; no heat intolerance  · Allergic/Immunologic	negative

## 2021-05-06 NOTE — PROGRESS NOTE ADULT - SUBJECTIVE AND OBJECTIVE BOX
post op check  pt stable in sicu    Vital Signs Last 24 Hrs  T(C): 36.4 (06 May 2021 05:52), Max: 36.4 (06 May 2021 05:52)  T(F): 97.6 (06 May 2021 05:52), Max: 97.6 (06 May 2021 05:52)  HR: 94 (06 May 2021 05:52) (94 - 94)  BP: 141/79 (06 May 2021 05:52) (141/79 - 141/79)  BP(mean): --  RR: 15 (06 May 2021 05:52) (15 - 15)  SpO2: 98% (06 May 2021 05:52) (98% - 98%)    General: AAOx3, resting in bed, comfortable  trach in place, sutured, cuff up  NGT in place  OC: intraoral skin paddle normal turgor, color, warm. strong triphasic doppler signal  neck soft and flat. staples. incision cdi. neck JONAS SS  L arm NVI, JONAS SS  soares, a-line    71yo F with L alveolar ridge scca s/p L composite resection, marginal mandibulectomy, placement of implants by OMFS, L SND 1-3, L radial forearm free flap 5/6, recovering appropriately.   - unasyn 1.5 q6h x24 hours  - lovenox/asa  - routine trach care, will deflate cuff in am  - NGT - please obtain CXR to confirm placement  - can start feeds once placement confirmed  - dc soares once OOB  - mark per sicu  - JONAS left neck and left arm, to bulb suction  - flap checks per PRS  - doppler per PRS  - monitor fluid status  - home meds  - bowel regimen  - pain control  - AM labs  - HOBE    ----------------------------------------------  Bernardino Fritz MD  Resident  Department of Otolaryngology - Head and Neck Surgery  Adult Page #[phone]  Peds Page #[phone]

## 2021-05-06 NOTE — H&P ADULT - NSICDXPASTMEDICALHX_GEN_ALL_CORE_FT
PAST MEDICAL HISTORY:  Aortic insufficiency     H/O varicose veins     Malignant neoplasm of gingiva

## 2021-05-06 NOTE — BRIEF OPERATIVE NOTE - NSICDXBRIEFPROCEDURE_GEN_ALL_CORE_FT
PROCEDURES:  Creation of fasciocutaneous radial forearm free flap 06-May-2021 18:51:13  Haresh Zhou  
PROCEDURES:  Selective neck dissection 06-May-2021 18:47:38  Liudmila Peng  Complex excision, lesion, oral cavity, with underlying muscle resection 06-May-2021 18:48:22  Liudmila Peng  Creation of fasciocutaneous radial forearm free flap 06-May-2021 18:51:13  Haresh Zhou  Open tracheostomy 06-May-2021 18:52:09  Liudmila Peng

## 2021-05-06 NOTE — BRIEF OPERATIVE NOTE - NSICDXBRIEFPREOP_GEN_ALL_CORE_FT
PRE-OP DIAGNOSIS:  Squamous cell carcinoma of oral cavity 06-May-2021 18:50:22  Liudmila Peng  
PRE-OP DIAGNOSIS:  Squamous cell carcinoma of oral cavity 06-May-2021 18:50:22  Liudmila Peng

## 2021-05-06 NOTE — H&P ADULT - ASSESSMENT
69y/o female scheduled for glossectomy left neck dissection tracheostomy, marginal reconstruction plate possible implant placement X3 on 5/6/2021 with Dr. Mosqueda at [facility] OR under GA

## 2021-05-06 NOTE — CONSULT NOTE ADULT - ATTENDING COMMENTS
I agree with the history, physical, and plan, which I have reviewed and edited where appropriate.  I agree with notes/assessment and detailed interval history of the health care providers on my service.  The patient is in SICU with diagnosis mentioned in the note.    The patient is a critical care patient with life threatening hemodynamic and metabolic instability in SICU.  The SICU team has a constant risk benefit analyzes discussion and coordinating care with the primary team, all consultants, House Staff and PA's..  I was physically present for the key portions of the evaluation and management (E/M) service provided.    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    I have personally examined the patient, reviewed data and laboratory tests/x-rays and all pertinent electronic images.  NEURO  Exam: GCS15, A&Ox4  RESPIRATORY  Mechanical Ventilation:   Exam: tracheostomy c/d/i, neck incision c/d/i, neck JONAS sanguinous  CARDIOVASCULAR  Exam: extremities WWP, cook doppler with strong signal  Cardiac Rhythm: normal sinus  GI/NUTRITION  Exam: abdomen soft, nondistended, NGT in place    The plan is specified below:  NEURO:   - Tylenol ATC  - Dilaudid PRN  HEENT:  - q1h flap checks w/Cook doppler  RESPIRATORY:   - Maintain SAO2>92%  CARDIOVASCULAR:   - MAP goal >65  GI/NUTRITION:   - NPO, will start tube feeds POD1 per ENT/PRS  - GENITOURINARY/RENAL:  - LR@75  HEMATOLOGIC:   -  rectal suppository today, will start ASA 81 POD1  -VTE prolx  INFECTIOUS DISEASE:  - Unasyn for perioperative prophylaxis until drains are removed per ENT  ENDOCRINE:  - Monitor serum glucose on daily BMP    DISPO: SICU    Critical Care Diagnoses: s/p tracheostomy requiring airway monitoring, s/p flap reconstruction requiring q1h flap checks

## 2021-05-06 NOTE — H&P ADULT - HISTORY OF PRESENT ILLNESS
69y/o female scheduled for glossectomy left neck dissection tracheostomy, marginal reconstruction plate possible implant placement X3 on 5/6/2021 with Dr. Mosqueda at [facility] OR under GA.  Pt states, "has inflammation of left lower gum for several yrs, worsening over time, denies pain.   S/p biopsy 3/2021 positive for malignancy

## 2021-05-06 NOTE — H&P ADULT - NSHPPHYSICALEXAM_GEN_ALL_CORE
Physical Exam:  · Constitutional		well-groomed; no distress  · Eyes		PERRL; EOMI; conjunctiva clear  · ENMT		mouth  · ENMT Comments	left lower gum lesion  · Neck		supple; no JVD  · Breasts		normal shape; no tenderness  · Back		normal shape; ROM intact; strength intact  · Respiratory		breath sounds equal; good air movement; respirations non-labored; clear to auscultation bilaterally; no chest wall tenderness; no intercostal retractions; no rales; no rhonchi; no wheezes  · Cardiovascular		regular rate and rhythm  normal PMI  · Cardiovascular Details	tachycardia; positive S1; positive S2; murmur  · Murmur Timing	systolic  · Character of Systolic Murmur	murmur loudness: I/VI  · Gastrointestinal	soft; nontender; no distention; no masses palpable; bowel sounds normal; no bruit; no rebound tenderness; no guarding; no rigidity; no organomegaly  · Genitourinary	patient refused  · Rectal	patient refused  · Extremities		no clubbing; no cyanosis; no pedal edema  · Vascular	detailed exam  · Radial Pulse	right normal; left normal  · DP Pulse	right normal; left normal  · Neurological	Alert & oriented; no sensory, motor or coordination deficits, normal reflexes  · Skin		warm and dry; color normal  · Lymph Nodes	no palpable lymph nodes  · Musculoskeletal		ROM intact; no joint swelling; normal strength  · Psychiatric		normal affect; normal behavior

## 2021-05-07 LAB
ANION GAP SERPL CALC-SCNC: 10 MMOL/L — SIGNIFICANT CHANGE UP (ref 7–14)
BLOOD GAS ARTERIAL COMPREHENSIVE RESULT: SIGNIFICANT CHANGE UP
BUN SERPL-MCNC: 9 MG/DL — SIGNIFICANT CHANGE UP (ref 7–23)
CALCIUM SERPL-MCNC: 9.3 MG/DL — SIGNIFICANT CHANGE UP (ref 8.4–10.5)
CHLORIDE SERPL-SCNC: 105 MMOL/L — SIGNIFICANT CHANGE UP (ref 98–107)
CO2 SERPL-SCNC: 24 MMOL/L — SIGNIFICANT CHANGE UP (ref 22–31)
CREAT SERPL-MCNC: 0.74 MG/DL — SIGNIFICANT CHANGE UP (ref 0.5–1.3)
GLUCOSE SERPL-MCNC: 136 MG/DL — HIGH (ref 70–99)
HCT VFR BLD CALC: 29.4 % — LOW (ref 34.5–45)
HGB BLD-MCNC: 9.6 G/DL — LOW (ref 11.5–15.5)
MAGNESIUM SERPL-MCNC: 3 MG/DL — HIGH (ref 1.6–2.6)
MCHC RBC-ENTMCNC: 31.6 PG — SIGNIFICANT CHANGE UP (ref 27–34)
MCHC RBC-ENTMCNC: 32.7 GM/DL — SIGNIFICANT CHANGE UP (ref 32–36)
MCV RBC AUTO: 96.7 FL — SIGNIFICANT CHANGE UP (ref 80–100)
NRBC # BLD: 0 /100 WBCS — SIGNIFICANT CHANGE UP
NRBC # FLD: 0 K/UL — SIGNIFICANT CHANGE UP
PHOSPHATE SERPL-MCNC: 4.3 MG/DL — SIGNIFICANT CHANGE UP (ref 2.5–4.5)
PLATELET # BLD AUTO: 145 K/UL — LOW (ref 150–400)
POTASSIUM SERPL-MCNC: 4.2 MMOL/L — SIGNIFICANT CHANGE UP (ref 3.5–5.3)
POTASSIUM SERPL-SCNC: 4.2 MMOL/L — SIGNIFICANT CHANGE UP (ref 3.5–5.3)
RBC # BLD: 3.04 M/UL — LOW (ref 3.8–5.2)
RBC # FLD: 12.5 % — SIGNIFICANT CHANGE UP (ref 10.3–14.5)
SODIUM SERPL-SCNC: 139 MMOL/L — SIGNIFICANT CHANGE UP (ref 135–145)
WBC # BLD: 10.29 K/UL — SIGNIFICANT CHANGE UP (ref 3.8–10.5)
WBC # FLD AUTO: 10.29 K/UL — SIGNIFICANT CHANGE UP (ref 3.8–10.5)

## 2021-05-07 PROCEDURE — 99232 SBSQ HOSP IP/OBS MODERATE 35: CPT | Mod: GC

## 2021-05-07 RX ORDER — OXYCODONE HYDROCHLORIDE 5 MG/1
5 TABLET ORAL EVERY 6 HOURS
Refills: 0 | Status: DISCONTINUED | OUTPATIENT
Start: 2021-05-07 | End: 2021-05-07

## 2021-05-07 RX ORDER — OXYCODONE HYDROCHLORIDE 5 MG/1
5 TABLET ORAL EVERY 6 HOURS
Refills: 0 | Status: DISCONTINUED | OUTPATIENT
Start: 2021-05-07 | End: 2021-05-13

## 2021-05-07 RX ORDER — ONDANSETRON 8 MG/1
4 TABLET, FILM COATED ORAL ONCE
Refills: 0 | Status: COMPLETED | OUTPATIENT
Start: 2021-05-07 | End: 2021-05-07

## 2021-05-07 RX ORDER — OXYCODONE HYDROCHLORIDE 5 MG/1
10 TABLET ORAL EVERY 6 HOURS
Refills: 0 | Status: DISCONTINUED | OUTPATIENT
Start: 2021-05-07 | End: 2021-05-13

## 2021-05-07 RX ORDER — OXYCODONE HYDROCHLORIDE 5 MG/1
10 TABLET ORAL EVERY 6 HOURS
Refills: 0 | Status: DISCONTINUED | OUTPATIENT
Start: 2021-05-07 | End: 2021-05-07

## 2021-05-07 RX ORDER — ACETAMINOPHEN 500 MG
975 TABLET ORAL EVERY 6 HOURS
Refills: 0 | Status: DISCONTINUED | OUTPATIENT
Start: 2021-05-07 | End: 2021-05-09

## 2021-05-07 RX ADMIN — SODIUM CHLORIDE 75 MILLILITER(S): 9 INJECTION, SOLUTION INTRAVENOUS at 01:43

## 2021-05-07 RX ADMIN — HYDROMORPHONE HYDROCHLORIDE 0.5 MILLIGRAM(S): 2 INJECTION INTRAMUSCULAR; INTRAVENOUS; SUBCUTANEOUS at 10:36

## 2021-05-07 RX ADMIN — Medication 1 APPLICATION(S): at 05:42

## 2021-05-07 RX ADMIN — Medication 1000 MILLIGRAM(S): at 06:42

## 2021-05-07 RX ADMIN — Medication 975 MILLIGRAM(S): at 18:29

## 2021-05-07 RX ADMIN — Medication 975 MILLIGRAM(S): at 12:21

## 2021-05-07 RX ADMIN — ONDANSETRON 4 MILLIGRAM(S): 8 TABLET, FILM COATED ORAL at 16:02

## 2021-05-07 RX ADMIN — Medication 1 APPLICATION(S): at 17:22

## 2021-05-07 RX ADMIN — Medication 975 MILLIGRAM(S): at 12:50

## 2021-05-07 RX ADMIN — AMPICILLIN SODIUM AND SULBACTAM SODIUM 100 GRAM(S): 250; 125 INJECTION, POWDER, FOR SUSPENSION INTRAMUSCULAR; INTRAVENOUS at 18:29

## 2021-05-07 RX ADMIN — Medication 50 GRAM(S): at 00:12

## 2021-05-07 RX ADMIN — ENOXAPARIN SODIUM 40 MILLIGRAM(S): 100 INJECTION SUBCUTANEOUS at 12:20

## 2021-05-07 RX ADMIN — Medication 1000 MILLIGRAM(S): at 00:23

## 2021-05-07 RX ADMIN — HYDROMORPHONE HYDROCHLORIDE 0.5 MILLIGRAM(S): 2 INJECTION INTRAMUSCULAR; INTRAVENOUS; SUBCUTANEOUS at 10:21

## 2021-05-07 RX ADMIN — Medication 1 APPLICATION(S): at 00:12

## 2021-05-07 RX ADMIN — Medication 200 GRAM(S): at 00:12

## 2021-05-07 RX ADMIN — Medication 400 MILLIGRAM(S): at 05:42

## 2021-05-07 RX ADMIN — Medication 975 MILLIGRAM(S): at 23:38

## 2021-05-07 RX ADMIN — AMPICILLIN SODIUM AND SULBACTAM SODIUM 100 GRAM(S): 250; 125 INJECTION, POWDER, FOR SUSPENSION INTRAMUSCULAR; INTRAVENOUS at 12:22

## 2021-05-07 RX ADMIN — AMPICILLIN SODIUM AND SULBACTAM SODIUM 100 GRAM(S): 250; 125 INJECTION, POWDER, FOR SUSPENSION INTRAMUSCULAR; INTRAVENOUS at 23:39

## 2021-05-07 RX ADMIN — Medication 81 MILLIGRAM(S): at 12:19

## 2021-05-07 RX ADMIN — AMPICILLIN SODIUM AND SULBACTAM SODIUM 100 GRAM(S): 250; 125 INJECTION, POWDER, FOR SUSPENSION INTRAMUSCULAR; INTRAVENOUS at 05:42

## 2021-05-07 RX ADMIN — Medication 975 MILLIGRAM(S): at 18:30

## 2021-05-07 NOTE — PROGRESS NOTE ADULT - ASSESSMENT
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      PLAN:    NEURO: acute post-op pain control, A&Ox3  - Tylenol ATC  - Dilaudid PRN    HEENT: s/p L composite resection, marginal mandibulectomy, placement of implants by OMFS, L SND 1-3, L radial forearm free flap 5/6  - q1h flap checks w/Cook doppler  - Monitor drain outputs: Neck JONAS x1, L forearm JONAS x1    RESPIRATORY: s/p tracheostomy 5/6  - tolerating trach collar  - Maintain SAO2>92%    CARDIOVASCULAR:   - MAP goal >65    GI/NUTRITION:   - NPO, will start tube feeds POD1 per ENT/PRS  - NGT confirmed in stomach with CXR  - Bowel regimen once starting tube feeds    GENITOURINARY/RENAL:  - LR@75  - Continue soares catheter  - Monitor I/Os  - Trend BUN/Cr daily  - Trend electrolytes, replete PRN    HEMATOLOGIC:   -  rectal suppository today, will start ASA 81 POD1  - Trend CBC, Coags daily  - VTE ppx: SCDs,     INFECTIOUS DISEASE:  - Unasyn for perioperative prophylaxis until drains are removed per ENT  - Bacitracin BID to neck and forearm incisions  - Trend WBC, monitor for signs of infection    ENDOCRINE:  - Monitor serum glucose on daily BMP    LINES/TUBES/DRAINS:  - PIVs  - Soares  - R Radial a-line  - NGT  - Tracheostomy  - Neck JONAS x1, L forearm JONAS x1    DISPO: SICU    -------------------------------------------------------------------------------------------------------------------  Critical Care Diagnoses: s/p tracheostomy requiring airway monitoring, s/p flap reconstruction requiring q1h flap checks    Nella Burden, PGY2  SICU [id number] ASSESSMENT:  ROMERO MIMS is a 70y Female 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      PLAN:    NEURO: acute post-op pain control, A&Ox3  - Tylenol ATC  - Dilaudid PRN    HEENT: s/p L composite resection, marginal mandibulectomy, placement of implants by OMFS, L SND 1-3, L radial forearm free flap 5/6  - q1h flap checks w/Cook doppler, switch to q2 flap checks in the PM   - Monitor drain outputs: Neck JONAS x1, L forearm JONAS x1    RESPIRATORY: s/p tracheostomy 5/6  - tolerating trach collar  - Maintain SAO2>92%    CARDIOVASCULAR:   - MAP goal >65    GI/NUTRITION:   - NPO, will start tube feeds today  - NGT confirmed in stomach with CXR  - Bowel regimen once starting tube feeds    GENITOURINARY/RENAL:  - LR@75  - d/c Allen  - Monitor I/Os  - Trend BUN/Cr daily  - Trend electrolytes, replete PRN    HEMATOLOGIC:   -  rectal suppository today, will start ASA 81 POD1  - Trend CBC, Coags daily  - VTE ppx: SCDs,     INFECTIOUS DISEASE:  - Unasyn for perioperative prophylaxis until drains are removed per ENT  - Bacitracin BID to neck and forearm incisions  - Trend WBC, monitor for signs of infection    ENDOCRINE:  - Monitor serum glucose on daily BMP    LINES/TUBES/DRAINS:  - PIVs  - Allen  - R Radial a-line  - NGT  - Tracheostomy  - Neck JONAS x1, L forearm JONAS x1    DISPO: SICU    -------------------------------------------------------------------------------------------------------------------  Critical Care Diagnoses: s/p tracheostomy requiring airway monitoring, s/p flap reconstruction requiring q1h flap checks    Nella Burden, PGY2  SICU [phone]

## 2021-05-07 NOTE — PROGRESS NOTE ADULT - SUBJECTIVE AND OBJECTIVE BOX
Subjective:   Patient seen at bedside this AM. Reports feeling well, without complaints. Has many questions about G tube    Objective:  Vital Signs  T(C): 36.6 (05-07 @ 08:00), Max: 37.7 (05-06 @ 20:00)  HR: 82 (05-07 @ 08:00) (81 - 110)  BP: --  RR: 12 (05-07 @ 08:00) (10 - 26)  SpO2: 98% (05-07 @ 08:00) (96% - 100%)  05-06-21 @ 07:01  -  05-07-21 @ 07:00  --------------------------------------------------------  IN:  Total IN: 0 mL    OUT:    Bulb (mL): 85 mL    Bulb (mL): 65 mL    Voided (mL): 960 mL  Total OUT: 1110 mL    Total NET: -1110 mL          Physical Exam:  GEN: resting in bed comfortably in NAD  HEENT: neck soft and flat. staples. incision cdi. neck JONAS. NGT in place  OC: intraoral skin paddle normal turgor, color, warm. strong triphasic doppler signal,   neck soft and flat. staples. incision cdi. neck JONAS SS  RESP: no increased WOB  EXTR: warm, well-perfused without gross deformities; spontaneous movement in b/l U/L extrem  NEURO: awake and alert    Labs:                        9.6    10.29 )-----------( 145      ( 07 May 2021 01:40 )             29.4   05-07    139  |  105  |  9   ----------------------------<  136<H>  4.2   |  24  |  0.74    Ca    9.3      07 May 2021 01:40  Phos  4.3     05-07  Mg     3.0     05-07      CAPILLARY BLOOD GLUCOSE          Medications:   MEDICATIONS  (STANDING):  acetaminophen  IVPB .. 1000 milliGRAM(s) IV Intermittent every 6 hours  ampicillin/sulbactam  IVPB 3 Gram(s) IV Intermittent once  ampicillin/sulbactam  IVPB 1.5 Gram(s) IV Intermittent every 6 hours  aspirin  chewable 81 milliGRAM(s) Oral daily  BACItracin   Ointment 1 Application(s) Topical two times a day  enoxaparin Injectable 40 milliGRAM(s) SubCutaneous daily  lactated ringers. 1000 milliLiter(s) (75 mL/Hr) IV Continuous <Continuous>    MEDICATIONS  (PRN):  HYDROmorphone  Injectable 0.5 milliGRAM(s) IV Push every 4 hours PRN Severe Pain (7 - 10)  HYDROmorphone  Injectable 0.25 milliGRAM(s) IV Push every 4 hours PRN Moderate Pain (4 - 6)    Assessment  69yo F with L alveolar ridge scca s/p L composite resection, marginal mandibulectomy, placement of implants by OMFS, L SND 1-3, L radial forearm free flap 5/6, recovering appropriately.     Plan  - unasyn 1.5 q6h x24 hours  - lovenox/asa  - can start feeds once G tube placement confirmed  - dc soares once OOB  - mark per sicu  - JONAS left neck and left arm, to bulb suctionA  - flap checks can decreased to q2h after   - doppler per PRS  - monitor fluid status  - home meds  - bowel regimen  - pain control  - AM labs        Annamarie Grissom, PGY-1  Plastic and Reconstructive Surgery  [id number]

## 2021-05-07 NOTE — PHYSICAL THERAPY INITIAL EVALUATION ADULT - PERTINENT HX OF CURRENT PROBLEM, REHAB EVAL
Pt. s/p left composite resection alveolar ridge SCCa, marginal mandibulectomy, left SND 1-4, trach, left radial FF, left plate recon with implants with OMFS POD#1.

## 2021-05-07 NOTE — PROGRESS NOTE ADULT - SUBJECTIVE AND OBJECTIVE BOX
SICU Daily Progress Note:    HPI: 70 y.o. female with PMH of L alveolar ridge stem cell cancer who underwent planned L composite resection, marginal mandibulectomy, placement of implants by OMFS, L SND 1-3, L radial forearm free flap 5/6 (, , 1L IVF, 500cc albumin), admitted to SICU post-operatively for q1h flap checks and airway monitoring of new tracheostomy.      MEDICATIONS  (STANDING):  acetaminophen  IVPB .. 1000 milliGRAM(s) IV Intermittent every 6 hours  ampicillin/sulbactam  IVPB 3 Gram(s) IV Intermittent once  ampicillin/sulbactam  IVPB 1.5 Gram(s) IV Intermittent every 6 hours  aspirin  chewable 81 milliGRAM(s) Oral daily  BACItracin   Ointment 1 Application(s) Topical two times a day  enoxaparin Injectable 40 milliGRAM(s) SubCutaneous daily  lactated ringers. 1000 milliLiter(s) (75 mL/Hr) IV Continuous <Continuous>    MEDICATIONS  (PRN):  HYDROmorphone  Injectable 0.5 milliGRAM(s) IV Push every 4 hours PRN Severe Pain (7 - 10)  HYDROmorphone  Injectable 0.25 milliGRAM(s) IV Push every 4 hours PRN Moderate Pain (4 - 6)    ICU Vital Signs Last 24 Hrs  T(C): 36.3 (07 May 2021 00:00), Max: 37.7 (06 May 2021 20:00)  T(F): 97.3 (07 May 2021 00:00), Max: 99.8 (06 May 2021 20:00)  HR: 91 (07 May 2021 02:00) (81 - 110)  BP: 141/79 (06 May 2021 05:52) (141/79 - 141/79)  BP(mean): --  ABP: 107/60 (07 May 2021 02:00) (90/62 - 115/71)  ABP(mean): 79 (07 May 2021 02:00) (71 - 88)  RR: 14 (07 May 2021 02:00) (10 - 26)  SpO2: 100% (07 May 2021 02:00) (97% - 100%)    I&O's Detail    06 May 2021 07:01  -  07 May 2021 02:48  --------------------------------------------------------  IN:    IV PiggyBack: 200 mL    Lactated Ringers: 525 mL  Total IN: 725 mL    OUT:    Bulb (mL): 65 mL    Bulb (mL): 50 mL    Voided (mL): 410 mL  Total OUT: 525 mL    Total NET: 200 mL      NEURO  Exam: awake, tracks, follows commands      RESPIRATORY  Exam: tracheostomy c/d/i, on trach collar tolerating, CTA B/L, neck incision c/d/i, neck JONAS sanguinous      CARDIOVASCULAR    Exam: extremities WWP, cook doppler with strong signal  Cardiac Rhythm: normal sinus      GI/NUTRITION  Exam: abdomen soft, nondistended, NGT in place  Diet: NPO      PATIENT CARE ACCESS DEVICES:  [x] Peripheral IV  [ ] Central Venous Line	[ ] R	[ ] L	[ ] IJ	[ ] Fem	[ ] SC	Placed:   [x] Arterial Line		[x] R	[ ] L	[ ] Fem	[x] Rad	[ ] Ax	Placed: 5/6/21  [ ] PICC:					[ ] Mediport  [x] Urinary Catheter, Date Placed: 5/6/21  [x] Necessity of urinary, arterial, and venous catheters discussed

## 2021-05-07 NOTE — PHYSICAL THERAPY INITIAL EVALUATION ADULT - ADDITIONAL COMMENTS
Pt. was left seated in chair post PT Evaluation, no apparent distress, all lines intact, call bell within reach. RN aware of pt. status.

## 2021-05-07 NOTE — PATIENT PROFILE ADULT - FUNCTIONAL SCREEN CURRENT LEVEL: COMMUNICATION, MLM
pt s/p trach/3 = unable to speak (not related to language barrier) pt s/p trach/0 = understands/communicates without difficulty

## 2021-05-07 NOTE — PHYSICAL THERAPY INITIAL EVALUATION ADULT - GENERAL OBSERVATIONS, REHAB EVAL
Consult received, chart reviewed. Patient received seated in chair, no apparent distress, +cardiac monitor, +NGT, +trach collar.

## 2021-05-07 NOTE — PROGRESS NOTE ADULT - SUBJECTIVE AND OBJECTIVE BOX
SUBJECTIVE:  Pt seen & examined at bedside  No acute events overnight  Pain controlled  No complaints  On bedrest  NPO, tube feeds held  cuff deflated this AM  No F/C, N/V, CP/SOB    Vital Signs Last 24 Hrs  T(C): 36.6 (07 May 2021 08:00), Max: 37.7 (06 May 2021 20:00)  T(F): 97.8 (07 May 2021 08:00), Max: 99.8 (06 May 2021 20:00)  HR: 82 (07 May 2021 08:00) (81 - 110)  BP: --  BP(mean): --  RR: 12 (07 May 2021 08:00) (10 - 26)  SpO2: 98% (07 May 2021 08:00) (96% - 100%)  General: NAD  Respiratory: No respiratory distress, stridor, or stertor  HEENT: neck soft/flat. staples in place. incision cdi. neck JONAS ss. NGT in place. 6LPC sutured in place, cuff deflated  OC: intraoral skin paddle normal turgor, color, warm. strong \doppler signal  Ext: L arm incisions are clean, dry and intact         70F s/p L composite resection alveolar ridge SCCa, marginal mandibulectomy, L SND 1-4, trach, L rad FF, L plate recon w/ implants w/ OMFS 5/6  - unasyn per PRS  - lovenox/asa  - may start tube feeds  - d/c soares  - PT consult/OOBTC  - mark per sicu  - JONAS left neck and left arm, to bulb suction  - flap checks per PRS  - doppler per PRS  - monitor fluid status  - home meds  - bowel regimen  - pain control

## 2021-05-08 LAB
ANION GAP SERPL CALC-SCNC: 11 MMOL/L — SIGNIFICANT CHANGE UP (ref 7–14)
BUN SERPL-MCNC: 9 MG/DL — SIGNIFICANT CHANGE UP (ref 7–23)
CALCIUM SERPL-MCNC: 7.9 MG/DL — LOW (ref 8.4–10.5)
CHLORIDE SERPL-SCNC: 107 MMOL/L — SIGNIFICANT CHANGE UP (ref 98–107)
CO2 SERPL-SCNC: 23 MMOL/L — SIGNIFICANT CHANGE UP (ref 22–31)
COVID-19 SPIKE DOMAIN AB INTERP: POSITIVE
COVID-19 SPIKE DOMAIN ANTIBODY RESULT: >250 U/ML — HIGH
CREAT SERPL-MCNC: 0.68 MG/DL — SIGNIFICANT CHANGE UP (ref 0.5–1.3)
GLUCOSE SERPL-MCNC: 104 MG/DL — HIGH (ref 70–99)
HCT VFR BLD CALC: 26 % — LOW (ref 34.5–45)
HCV AB S/CO SERPL IA: 0.06 S/CO — SIGNIFICANT CHANGE UP (ref 0–0.99)
HCV AB SERPL-IMP: SIGNIFICANT CHANGE UP
HGB BLD-MCNC: 8.6 G/DL — LOW (ref 11.5–15.5)
MAGNESIUM SERPL-MCNC: 1.8 MG/DL — SIGNIFICANT CHANGE UP (ref 1.6–2.6)
MCHC RBC-ENTMCNC: 32.8 PG — SIGNIFICANT CHANGE UP (ref 27–34)
MCHC RBC-ENTMCNC: 33.1 GM/DL — SIGNIFICANT CHANGE UP (ref 32–36)
MCV RBC AUTO: 99.2 FL — SIGNIFICANT CHANGE UP (ref 80–100)
NRBC # BLD: 0 /100 WBCS — SIGNIFICANT CHANGE UP
NRBC # FLD: 0.02 K/UL — HIGH
PHOSPHATE SERPL-MCNC: 2.2 MG/DL — LOW (ref 2.5–4.5)
PLATELET # BLD AUTO: 120 K/UL — LOW (ref 150–400)
POTASSIUM SERPL-MCNC: 3.9 MMOL/L — SIGNIFICANT CHANGE UP (ref 3.5–5.3)
POTASSIUM SERPL-SCNC: 3.9 MMOL/L — SIGNIFICANT CHANGE UP (ref 3.5–5.3)
RBC # BLD: 2.62 M/UL — LOW (ref 3.8–5.2)
RBC # FLD: 12.5 % — SIGNIFICANT CHANGE UP (ref 10.3–14.5)
SARS-COV-2 IGG+IGM SERPL QL IA: >250 U/ML — HIGH
SARS-COV-2 IGG+IGM SERPL QL IA: POSITIVE
SODIUM SERPL-SCNC: 141 MMOL/L — SIGNIFICANT CHANGE UP (ref 135–145)
WBC # BLD: 5.16 K/UL — SIGNIFICANT CHANGE UP (ref 3.8–10.5)
WBC # FLD AUTO: 5.16 K/UL — SIGNIFICANT CHANGE UP (ref 3.8–10.5)

## 2021-05-08 PROCEDURE — 93010 ELECTROCARDIOGRAM REPORT: CPT

## 2021-05-08 PROCEDURE — 99232 SBSQ HOSP IP/OBS MODERATE 35: CPT | Mod: GC

## 2021-05-08 RX ORDER — ACETAMINOPHEN 500 MG
1000 TABLET ORAL ONCE
Refills: 0 | Status: COMPLETED | OUTPATIENT
Start: 2021-05-08 | End: 2021-05-09

## 2021-05-08 RX ORDER — SENNA PLUS 8.6 MG/1
2 TABLET ORAL AT BEDTIME
Refills: 0 | Status: DISCONTINUED | OUTPATIENT
Start: 2021-05-08 | End: 2021-05-13

## 2021-05-08 RX ORDER — CHLORHEXIDINE GLUCONATE 213 G/1000ML
15 SOLUTION TOPICAL
Refills: 0 | Status: DISCONTINUED | OUTPATIENT
Start: 2021-05-08 | End: 2021-05-13

## 2021-05-08 RX ORDER — ONDANSETRON 8 MG/1
4 TABLET, FILM COATED ORAL ONCE
Refills: 0 | Status: COMPLETED | OUTPATIENT
Start: 2021-05-08 | End: 2021-05-08

## 2021-05-08 RX ORDER — POTASSIUM PHOSPHATE, MONOBASIC POTASSIUM PHOSPHATE, DIBASIC 236; 224 MG/ML; MG/ML
15 INJECTION, SOLUTION INTRAVENOUS ONCE
Refills: 0 | Status: COMPLETED | OUTPATIENT
Start: 2021-05-08 | End: 2021-05-08

## 2021-05-08 RX ORDER — MAGNESIUM SULFATE 500 MG/ML
2 VIAL (ML) INJECTION ONCE
Refills: 0 | Status: COMPLETED | OUTPATIENT
Start: 2021-05-08 | End: 2021-05-08

## 2021-05-08 RX ADMIN — Medication 975 MILLIGRAM(S): at 17:06

## 2021-05-08 RX ADMIN — Medication 81 MILLIGRAM(S): at 11:21

## 2021-05-08 RX ADMIN — POTASSIUM PHOSPHATE, MONOBASIC POTASSIUM PHOSPHATE, DIBASIC 62.5 MILLIMOLE(S): 236; 224 INJECTION, SOLUTION INTRAVENOUS at 06:27

## 2021-05-08 RX ADMIN — AMPICILLIN SODIUM AND SULBACTAM SODIUM 100 GRAM(S): 250; 125 INJECTION, POWDER, FOR SUSPENSION INTRAMUSCULAR; INTRAVENOUS at 17:03

## 2021-05-08 RX ADMIN — Medication 50 GRAM(S): at 04:01

## 2021-05-08 RX ADMIN — Medication 975 MILLIGRAM(S): at 06:27

## 2021-05-08 RX ADMIN — Medication 1 APPLICATION(S): at 17:03

## 2021-05-08 RX ADMIN — Medication 1 APPLICATION(S): at 06:27

## 2021-05-08 RX ADMIN — ENOXAPARIN SODIUM 40 MILLIGRAM(S): 100 INJECTION SUBCUTANEOUS at 11:21

## 2021-05-08 RX ADMIN — AMPICILLIN SODIUM AND SULBACTAM SODIUM 100 GRAM(S): 250; 125 INJECTION, POWDER, FOR SUSPENSION INTRAMUSCULAR; INTRAVENOUS at 11:22

## 2021-05-08 RX ADMIN — Medication 975 MILLIGRAM(S): at 06:50

## 2021-05-08 RX ADMIN — Medication 975 MILLIGRAM(S): at 17:03

## 2021-05-08 RX ADMIN — SENNA PLUS 2 TABLET(S): 8.6 TABLET ORAL at 22:08

## 2021-05-08 RX ADMIN — ONDANSETRON 4 MILLIGRAM(S): 8 TABLET, FILM COATED ORAL at 22:08

## 2021-05-08 RX ADMIN — AMPICILLIN SODIUM AND SULBACTAM SODIUM 100 GRAM(S): 250; 125 INJECTION, POWDER, FOR SUSPENSION INTRAMUSCULAR; INTRAVENOUS at 05:01

## 2021-05-08 RX ADMIN — Medication 975 MILLIGRAM(S): at 00:38

## 2021-05-08 RX ADMIN — Medication 650 MILLIGRAM(S): at 11:22

## 2021-05-08 RX ADMIN — CHLORHEXIDINE GLUCONATE 15 MILLILITER(S): 213 SOLUTION TOPICAL at 17:03

## 2021-05-08 RX ADMIN — Medication 975 MILLIGRAM(S): at 11:22

## 2021-05-08 NOTE — PROGRESS NOTE ADULT - SUBJECTIVE AND OBJECTIVE BOX
SICU Daily Progress Note:    HPI: 70 y.o. female with PMH of L alveolar ridge stem cell cancer who underwent planned L composite resection, marginal mandibulectomy, placement of implants by OMFS, L SND 1-3, L radial forearm free flap 5/6 (, , 1L IVF, 500cc albumin), admitted to SICU post-operatively for q1h flap checks and airway monitoring of new tracheostomy.      24 hour events:  - started TFs  - Q2 flap checks  - complaining of nausea, given zofran in the morning and afternoon    MEDICATIONS  (STANDING):  acetaminophen    Suspension .. 975 milliGRAM(s) Enteral Tube every 6 hours  ampicillin/sulbactam  IVPB 3 Gram(s) IV Intermittent once  ampicillin/sulbactam  IVPB 1.5 Gram(s) IV Intermittent every 6 hours  aspirin  chewable 81 milliGRAM(s) Oral daily  BACItracin   Ointment 1 Application(s) Topical two times a day  enoxaparin Injectable 40 milliGRAM(s) SubCutaneous daily  lactated ringers. 1000 milliLiter(s) (75 mL/Hr) IV Continuous <Continuous>    MEDICATIONS  (PRN):  oxyCODONE    IR 5 milliGRAM(s) Oral every 6 hours PRN Moderate Pain (4 - 6)  oxyCODONE    IR 10 milliGRAM(s) Oral every 6 hours PRN Severe Pain (7 - 10)      ICU Vital Signs Last 24 Hrs  T(C): 36.4 (07 May 2021 20:00), Max: 36.7 (07 May 2021 16:00)  T(F): 97.5 (07 May 2021 20:00), Max: 98 (07 May 2021 16:00)  HR: 77 (07 May 2021 22:00) (72 - 102)  BP: 107/57 (07 May 2021 22:00) (88/38 - 119/55)  BP(mean): 68 (07 May 2021 22:00) (48 - 68)  ABP: 115/74 (07 May 2021 10:00) (94/55 - 117/67)  ABP(mean): 91 (07 May 2021 10:00) (70 - 91)  RR: 12 (07 May 2021 22:00) (10 - 19)  SpO2: 98% (07 May 2021 22:00) (89% - 100%)    I&O's Detail    06 May 2021 07:01  -  07 May 2021 07:00  --------------------------------------------------------  IN:    IV PiggyBack: 200 mL    Lactated Ringers: 900 mL  Total IN: 1100 mL    OUT:    Bulb (mL): 85 mL    Bulb (mL): 65 mL    Voided (mL): 960 mL  Total OUT: 1110 mL    Total NET: -10 mL      07 May 2021 07:01  -  08 May 2021 00:54  --------------------------------------------------------  IN:    IV PiggyBack: 100 mL    Jevity 1.2: 180 mL    Lactated Ringers: 1125 mL  Total IN: 1405 mL    OUT:    Bulb (mL): 40 mL    Bulb (mL): 35 mL    Voided (mL): 825 mL  Total OUT: 900 mL    Total NET: 505 mL      NEURO  Exam: awake, tracks, follows commands      RESPIRATORY  Exam: tracheostomy c/d/i, on trach collar tolerating, CTA B/L, neck incision c/d/i, neck JONAS sanguinous      CARDIOVASCULAR    Exam: extremities WWP, cook doppler with strong signal  Cardiac Rhythm: normal sinus      GI/NUTRITION  Exam: abdomen soft, nondistended, NGT in place  Diet: NPO      PATIENT CARE ACCESS DEVICES:  [x] Peripheral IV  [ ] Central Venous Line	[ ] R	[ ] L	[ ] IJ	[ ] Fem	[ ] SC	Placed:   [x] Arterial Line		[x] R	[ ] L	[ ] Fem	[x] Rad	[ ] Ax	Placed: 5/6/21  [ ] PICC:					[ ] Mediport  [x] Urinary Catheter, Date Placed: 5/6/21  [x] Necessity of urinary, arterial, and venous catheters discussed

## 2021-05-08 NOTE — PROGRESS NOTE ADULT - SUBJECTIVE AND OBJECTIVE BOX
Subjective:   Patient seen at bedside this AM. Reports feeling well, without complaints. No acute events overnight.     Objective:  Vital Signs  T(C): 36.5 (05-08 @ 08:00), Max: 36.7 (05-07 @ 16:00)  HR: 87 (05-08 @ 08:00) (72 - 87)  BP: 122/68 (05-08 @ 08:00) (88/38 - 125/64)  RR: 17 (05-08 @ 08:00) (11 - 17)  SpO2: 95% (05-08 @ 08:00) (89% - 100%)  05-07-21 @ 07:01  -  05-08-21 @ 07:00  --------------------------------------------------------  IN:  Total IN: 0 mL    OUT:    Bulb (mL): 39 mL    Bulb (mL): 52 mL    Voided (mL): 1650 mL  Total OUT: 1741 mL    Total NET: -1741 mL    Physical Exam:  GEN: resting in bed comfortably in NAD  HEENT: neck soft and flat. staples. incision cdi. neck JONAS. NGT in place  OC: intraoral skin paddle normal turgor, color, warm. strong triphasic doppler signal,   neck soft and flat. staples. incision cdi. neck JONAS SS  RESP: no increased WOB  EXTR: warm, well-perfused without gross deformities; spontaneous movement in b/l U/L extrem  NEURO: awake and alert      Labs:                        8.6    5.16  )-----------( 120      ( 08 May 2021 02:38 )             26.0   05-08    141  |  107  |  9   ----------------------------<  104<H>  3.9   |  23  |  0.68    Ca    7.9<L>      08 May 2021 02:38  Phos  2.2     05-08  Mg     1.8     05-08      CAPILLARY BLOOD GLUCOSE          Medications:   MEDICATIONS  (STANDING):  acetaminophen    Suspension .. 975 milliGRAM(s) Enteral Tube every 6 hours  ampicillin/sulbactam  IVPB 1.5 Gram(s) IV Intermittent every 6 hours  aspirin  chewable 81 milliGRAM(s) Oral daily  BACItracin   Ointment 1 Application(s) Topical two times a day  enoxaparin Injectable 40 milliGRAM(s) SubCutaneous daily  senna 2 Tablet(s) Oral at bedtime    MEDICATIONS  (PRN):  oxyCODONE    IR 5 milliGRAM(s) Oral every 6 hours PRN Moderate Pain (4 - 6)  oxyCODONE    IR 10 milliGRAM(s) Oral every 6 hours PRN Severe Pain (7 - 10)    Assessment  71yo F with L alveolar ridge scca s/p L composite resection, marginal mandibulectomy, placement of implants by OMFS, L SND 1-3, L radial forearm free flap 5/6, recovering appropriately.     Plan  - lovenox/asa  - dc soares once OOB  - JONAS left neck and left arm, to bulb suction  - flap checks q2h, can be q4h on the floor tomorrow  - doppler   - Appreciate SICU care    Annamarie Grissom, PGY-1  Plastic and Reconstructive Surgery  [id number]

## 2021-05-08 NOTE — PROGRESS NOTE ADULT - ASSESSMENT
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      PLAN:    NEURO: acute post-op pain control, A&Ox3  - Tylenol ATC  - Dilaudid PRN    HEENT: s/p L composite resection, marginal mandibulectomy, placement of implants by OMFS, L SND 1-3, L radial forearm free flap 5/6  - q2h flap checks w/Cook doppler   - Monitor drain outputs: Neck JONAS x1, L forearm JONAS x1    RESPIRATORY: s/p tracheostomy 5/6  - tolerating trach collar  - Maintain SAO2>92%    CARDIOVASCULAR:   - MAP goal >65    GI/NUTRITION:   - currently on TFs  - NGT confirmed in stomach with CXR  -  on tube feeds, started bowel regimen    GENITOURINARY/RENAL:  - LR@75  - d/c Allen, passed TOV  - Monitor I/Os  - Trend BUN/Cr daily  - Trend electrolytes, replete PRN    HEMATOLOGIC:   -  rectal suppository given, cont with ASA 81   - Trend CBC, Coags daily  - VTE ppx: SCDs,     INFECTIOUS DISEASE:  - Unasyn for perioperative prophylaxis until drains are removed per ENT  - Bacitracin BID to neck and forearm incisions  - Trend WBC, monitor for signs of infection    ENDOCRINE:  - Monitor serum glucose on daily BMP    LINES/TUBES/DRAINS:  - PIVs  - Allen  - R Radial a-line  - NGT  - Tracheostomy  - Neck JONAS x1, L forearm JONAS x1    DISPO: SICU    -------------------------------------------------------------------------------------------------------------------  Critical Care Diagnoses: s/p tracheostomy requiring airway monitoring, s/p flap reconstruction requiring q1h flap checks

## 2021-05-08 NOTE — PROGRESS NOTE ADULT - SUBJECTIVE AND OBJECTIVE BOX
SUBJECTIVE:  Pt seen & examined at bedside  No acute events overnight  Pain controlled  No complaints  No F/C, N/V, CP/SOB    Vital Signs Last 24 Hrs  T(C): 36.5 (08 May 2021 08:00), Max: 36.7 (07 May 2021 16:00)  T(F): 97.7 (08 May 2021 08:00), Max: 98 (07 May 2021 16:00)  HR: 87 (08 May 2021 08:00) (72 - 87)  BP: 122/68 (08 May 2021 08:00) (88/38 - 125/64)  BP(mean): 79 (08 May 2021 08:00) (48 - 79)  RR: 17 (08 May 2021 08:00) (11 - 17)  SpO2: 95% (08 May 2021 08:00) (89% - 100%)    General: NAD  Respiratory: No respiratory distress, stridor, or stertor  HEENT: neck soft/flat. staples in place. incision cdi. neck JONAS ss. NGT in place. 6LPC sutured in place, cuff deflated  OC: intraoral skin paddle normal turgor, color, warm. strong \doppler signal  Ext: L arm incisions are clean, dry and intact   JONAS arm 52, JONAS neck 39        70F s/p L composite resection alveolar ridge SCCa, marginal mandibulectomy, L SND 1-4, trach, L rad FF, L plate recon w/ implants w/ OMFS 5/6  - unasyn per PRS  - lovenox/asa  - continue TFs  - PT consult/OOBTC  - F/u repeat calcium  - JONAS left neck and left arm, to bulb suction  - flap checks per PRS  - doppler per PRS  - monitor fluid status  - home meds  - bowel regimen  - pain control

## 2021-05-09 LAB
ANION GAP SERPL CALC-SCNC: 10 MMOL/L — SIGNIFICANT CHANGE UP (ref 7–14)
ANION GAP SERPL CALC-SCNC: 9 MMOL/L — SIGNIFICANT CHANGE UP (ref 7–14)
BUN SERPL-MCNC: 8 MG/DL — SIGNIFICANT CHANGE UP (ref 7–23)
BUN SERPL-MCNC: 9 MG/DL — SIGNIFICANT CHANGE UP (ref 7–23)
CALCIUM SERPL-MCNC: 7.1 MG/DL — LOW (ref 8.4–10.5)
CALCIUM SERPL-MCNC: 8.7 MG/DL — SIGNIFICANT CHANGE UP (ref 8.4–10.5)
CHLORIDE SERPL-SCNC: 110 MMOL/L — HIGH (ref 98–107)
CHLORIDE SERPL-SCNC: 92 MMOL/L — LOW (ref 98–107)
CO2 SERPL-SCNC: 23 MMOL/L — SIGNIFICANT CHANGE UP (ref 22–31)
CO2 SERPL-SCNC: 29 MMOL/L — SIGNIFICANT CHANGE UP (ref 22–31)
CREAT SERPL-MCNC: 0.52 MG/DL — SIGNIFICANT CHANGE UP (ref 0.5–1.3)
CREAT SERPL-MCNC: 0.69 MG/DL — SIGNIFICANT CHANGE UP (ref 0.5–1.3)
GLUCOSE SERPL-MCNC: 102 MG/DL — HIGH (ref 70–99)
GLUCOSE SERPL-MCNC: 111 MG/DL — HIGH (ref 70–99)
HCT VFR BLD CALC: 25.7 % — LOW (ref 34.5–45)
HGB BLD-MCNC: 8.3 G/DL — LOW (ref 11.5–15.5)
MAGNESIUM SERPL-MCNC: 1.8 MG/DL — SIGNIFICANT CHANGE UP (ref 1.6–2.6)
MAGNESIUM SERPL-MCNC: 2.1 MG/DL — SIGNIFICANT CHANGE UP (ref 1.6–2.6)
MCHC RBC-ENTMCNC: 32.3 GM/DL — SIGNIFICANT CHANGE UP (ref 32–36)
MCHC RBC-ENTMCNC: 32.4 PG — SIGNIFICANT CHANGE UP (ref 27–34)
MCV RBC AUTO: 100.4 FL — HIGH (ref 80–100)
NRBC # BLD: 0 /100 WBCS — SIGNIFICANT CHANGE UP
NRBC # FLD: 0 K/UL — SIGNIFICANT CHANGE UP
PHOSPHATE SERPL-MCNC: 3 MG/DL — SIGNIFICANT CHANGE UP (ref 2.5–4.5)
PHOSPHATE SERPL-MCNC: 3.4 MG/DL — SIGNIFICANT CHANGE UP (ref 2.5–4.5)
PLATELET # BLD AUTO: 123 K/UL — LOW (ref 150–400)
POTASSIUM SERPL-MCNC: 3.4 MMOL/L — LOW (ref 3.5–5.3)
POTASSIUM SERPL-MCNC: 4.3 MMOL/L — SIGNIFICANT CHANGE UP (ref 3.5–5.3)
POTASSIUM SERPL-SCNC: 3.4 MMOL/L — LOW (ref 3.5–5.3)
POTASSIUM SERPL-SCNC: 4.3 MMOL/L — SIGNIFICANT CHANGE UP (ref 3.5–5.3)
RBC # BLD: 2.56 M/UL — LOW (ref 3.8–5.2)
RBC # FLD: 12.4 % — SIGNIFICANT CHANGE UP (ref 10.3–14.5)
SODIUM SERPL-SCNC: 125 MMOL/L — LOW (ref 135–145)
SODIUM SERPL-SCNC: 148 MMOL/L — HIGH (ref 135–145)
WBC # BLD: 5.15 K/UL — SIGNIFICANT CHANGE UP (ref 3.8–10.5)
WBC # FLD AUTO: 5.15 K/UL — SIGNIFICANT CHANGE UP (ref 3.8–10.5)

## 2021-05-09 PROCEDURE — 99231 SBSQ HOSP IP/OBS SF/LOW 25: CPT

## 2021-05-09 PROCEDURE — 93010 ELECTROCARDIOGRAM REPORT: CPT

## 2021-05-09 RX ORDER — ACETAMINOPHEN 500 MG
1000 TABLET ORAL EVERY 6 HOURS
Refills: 0 | Status: COMPLETED | OUTPATIENT
Start: 2021-05-09 | End: 2021-05-10

## 2021-05-09 RX ORDER — POTASSIUM CHLORIDE 20 MEQ
10 PACKET (EA) ORAL
Refills: 0 | Status: COMPLETED | OUTPATIENT
Start: 2021-05-09 | End: 2021-05-09

## 2021-05-09 RX ORDER — METOCLOPRAMIDE HCL 10 MG
10 TABLET ORAL ONCE
Refills: 0 | Status: COMPLETED | OUTPATIENT
Start: 2021-05-09 | End: 2021-05-09

## 2021-05-09 RX ORDER — SIMETHICONE 80 MG/1
80 TABLET, CHEWABLE ORAL ONCE
Refills: 0 | Status: COMPLETED | OUTPATIENT
Start: 2021-05-09 | End: 2021-05-09

## 2021-05-09 RX ORDER — MAGNESIUM SULFATE 500 MG/ML
2 VIAL (ML) INJECTION ONCE
Refills: 0 | Status: COMPLETED | OUTPATIENT
Start: 2021-05-09 | End: 2021-05-09

## 2021-05-09 RX ORDER — PANTOPRAZOLE SODIUM 20 MG/1
40 TABLET, DELAYED RELEASE ORAL ONCE
Refills: 0 | Status: COMPLETED | OUTPATIENT
Start: 2021-05-09 | End: 2021-05-09

## 2021-05-09 RX ADMIN — Medication 1 APPLICATION(S): at 05:09

## 2021-05-09 RX ADMIN — Medication 1000 MILLIGRAM(S): at 17:46

## 2021-05-09 RX ADMIN — PANTOPRAZOLE SODIUM 40 MILLIGRAM(S): 20 TABLET, DELAYED RELEASE ORAL at 11:29

## 2021-05-09 RX ADMIN — Medication 1 APPLICATION(S): at 17:16

## 2021-05-09 RX ADMIN — SENNA PLUS 2 TABLET(S): 8.6 TABLET ORAL at 22:54

## 2021-05-09 RX ADMIN — Medication 10 MILLIGRAM(S): at 04:39

## 2021-05-09 RX ADMIN — Medication 400 MILLIGRAM(S): at 11:29

## 2021-05-09 RX ADMIN — Medication 1000 MILLIGRAM(S): at 01:15

## 2021-05-09 RX ADMIN — AMPICILLIN SODIUM AND SULBACTAM SODIUM 100 GRAM(S): 250; 125 INJECTION, POWDER, FOR SUSPENSION INTRAMUSCULAR; INTRAVENOUS at 23:03

## 2021-05-09 RX ADMIN — Medication 100 MILLIEQUIVALENT(S): at 04:10

## 2021-05-09 RX ADMIN — CHLORHEXIDINE GLUCONATE 15 MILLILITER(S): 213 SOLUTION TOPICAL at 05:09

## 2021-05-09 RX ADMIN — AMPICILLIN SODIUM AND SULBACTAM SODIUM 100 GRAM(S): 250; 125 INJECTION, POWDER, FOR SUSPENSION INTRAMUSCULAR; INTRAVENOUS at 17:16

## 2021-05-09 RX ADMIN — Medication 1000 MILLIGRAM(S): at 12:00

## 2021-05-09 RX ADMIN — Medication 400 MILLIGRAM(S): at 23:03

## 2021-05-09 RX ADMIN — AMPICILLIN SODIUM AND SULBACTAM SODIUM 100 GRAM(S): 250; 125 INJECTION, POWDER, FOR SUSPENSION INTRAMUSCULAR; INTRAVENOUS at 05:08

## 2021-05-09 RX ADMIN — Medication 400 MILLIGRAM(S): at 00:15

## 2021-05-09 RX ADMIN — Medication 50 GRAM(S): at 04:10

## 2021-05-09 RX ADMIN — AMPICILLIN SODIUM AND SULBACTAM SODIUM 100 GRAM(S): 250; 125 INJECTION, POWDER, FOR SUSPENSION INTRAMUSCULAR; INTRAVENOUS at 11:29

## 2021-05-09 RX ADMIN — AMPICILLIN SODIUM AND SULBACTAM SODIUM 100 GRAM(S): 250; 125 INJECTION, POWDER, FOR SUSPENSION INTRAMUSCULAR; INTRAVENOUS at 00:15

## 2021-05-09 RX ADMIN — ENOXAPARIN SODIUM 40 MILLIGRAM(S): 100 INJECTION SUBCUTANEOUS at 11:29

## 2021-05-09 RX ADMIN — CHLORHEXIDINE GLUCONATE 15 MILLILITER(S): 213 SOLUTION TOPICAL at 17:16

## 2021-05-09 RX ADMIN — Medication 400 MILLIGRAM(S): at 17:16

## 2021-05-09 RX ADMIN — Medication 100 MILLIEQUIVALENT(S): at 05:09

## 2021-05-09 NOTE — PROGRESS NOTE ADULT - SUBJECTIVE AND OBJECTIVE BOX
SICU Daily Progress Note:    HPI: 70 y.o. female with PMH of L alveolar ridge stem cell cancer who underwent planned L composite resection, marginal mandibulectomy, placement of implants by OMFS, L SND 1-3, L radial forearm free flap 5/6 (, , 1L IVF, 500cc albumin), admitted to SICU post-operatively for q1h flap checks and airway monitoring of new tracheostomy.      24 hour events:  - c/o chest tightness, EKG NSR  - pt stated it chest tightness was gas pain  - Q2 flap checks  - Peridex added for oral care    MEDICATIONS  (STANDING):  acetaminophen    Suspension .. 975 milliGRAM(s) Enteral Tube every 6 hours  ampicillin/sulbactam  IVPB 1.5 Gram(s) IV Intermittent every 6 hours  aspirin  chewable 81 milliGRAM(s) Oral daily  BACItracin   Ointment 1 Application(s) Topical two times a day  chlorhexidine 0.12% Liquid 15 milliLiter(s) Oral Mucosa two times a day  enoxaparin Injectable 40 milliGRAM(s) SubCutaneous daily  senna 2 Tablet(s) Oral at bedtime    MEDICATIONS  (PRN):  oxyCODONE    IR 5 milliGRAM(s) Oral every 6 hours PRN Moderate Pain (4 - 6)  oxyCODONE    IR 10 milliGRAM(s) Oral every 6 hours PRN Severe Pain (7 - 10)    ICU Vital Signs Last 24 Hrs  T(C): 37.1 (08 May 2021 20:00), Max: 37.2 (08 May 2021 12:00)  T(F): 98.8 (08 May 2021 20:00), Max: 98.9 (08 May 2021 12:00)  HR: 78 (08 May 2021 21:00) (75 - 95)  BP: 130/66 (08 May 2021 21:00) (106/62 - 142/51)  BP(mean): 83 (08 May 2021 21:00) (66 - 119)  ABP: --  ABP(mean): --  RR: 13 (08 May 2021 21:00) (11 - 19)  SpO2: 98% (08 May 2021 21:00) (95% - 100%)    I&O's Detail    07 May 2021 07:01  -  08 May 2021 07:00  --------------------------------------------------------  IN:    IV PiggyBack: 350 mL    Jevity 1.2: 544 mL    Lactated Ringers: 1800 mL  Total IN: 2694 mL    OUT:    Bulb (mL): 39 mL    Bulb (mL): 52 mL    Voided (mL): 1650 mL  Total OUT: 1741 mL    Total NET: 953 mL      08 May 2021 07:01  -  09 May 2021 00:17  --------------------------------------------------------  IN:    IV PiggyBack: 100 mL    Jevity 1.2: 672 mL    Lactated Ringers: 150 mL  Total IN: 922 mL    OUT:    Bulb (mL): 35 mL    Bulb (mL): 35 mL    Voided (mL): 950 mL  Total OUT: 1020 mL    Total NET: -98 mL      NEURO  Exam: awake, tracks, follows commands      RESPIRATORY  Exam: tracheostomy c/d/i, on trach collar tolerating, CTA B/L, neck incision c/d/i, neck JONAS sanguinous      CARDIOVASCULAR    Exam: extremities WWP, cook doppler with strong signal  Cardiac Rhythm: normal sinus      GI/NUTRITION  Exam: abdomen soft, nondistended, NGT in place  Diet: NPO      PATIENT CARE ACCESS DEVICES:  [x] Peripheral IV  [ ] Central Venous Line	[ ] R	[ ] L	[ ] IJ	[ ] Fem	[ ] SC	Placed:   [x] Arterial Line		[x] R	[ ] L	[ ] Fem	[x] Rad	[ ] Ax	Placed: 5/6/21  [ ] PICC:					[ ] Mediport  [x] Urinary Catheter, Date Placed: 5/6/21  [x] Necessity of urinary, arterial, and venous catheters discussed     SICU Daily Progress Note:    HPI: 70 y.o. female with PMH of L alveolar ridge stem cell cancer who underwent planned L composite resection, marginal mandibulectomy, placement of implants by OMFS, L SND 1-3, L radial forearm free flap 5/6 (, , 1L IVF, 500cc albumin), admitted to SICU post-operatively for q1h flap checks and airway monitoring of new tracheostomy.      24 hour events:    - c/o chest tightness, EKG NSR  - pt stated it chest tightness was gas pain - protonix x1  - Q4 flap checks  - Peridex added for oral care      MEDICATIONS  (STANDING):  acetaminophen    Suspension .. 975 milliGRAM(s) Enteral Tube every 6 hours  ampicillin/sulbactam  IVPB 1.5 Gram(s) IV Intermittent every 6 hours  aspirin  chewable 81 milliGRAM(s) Oral daily  BACItracin   Ointment 1 Application(s) Topical two times a day  chlorhexidine 0.12% Liquid 15 milliLiter(s) Oral Mucosa two times a day  enoxaparin Injectable 40 milliGRAM(s) SubCutaneous daily  senna 2 Tablet(s) Oral at bedtime    MEDICATIONS  (PRN):  oxyCODONE    IR 5 milliGRAM(s) Oral every 6 hours PRN Moderate Pain (4 - 6)  oxyCODONE    IR 10 milliGRAM(s) Oral every 6 hours PRN Severe Pain (7 - 10)    ICU Vital Signs Last 24 Hrs  T(C): 37.1 (08 May 2021 20:00), Max: 37.2 (08 May 2021 12:00)  T(F): 98.8 (08 May 2021 20:00), Max: 98.9 (08 May 2021 12:00)  HR: 78 (08 May 2021 21:00) (75 - 95)  BP: 130/66 (08 May 2021 21:00) (106/62 - 142/51)  BP(mean): 83 (08 May 2021 21:00) (66 - 119)  ABP: --  ABP(mean): --  RR: 13 (08 May 2021 21:00) (11 - 19)  SpO2: 98% (08 May 2021 21:00) (95% - 100%)    I&O's Detail    07 May 2021 07:01  -  08 May 2021 07:00  --------------------------------------------------------  IN:    IV PiggyBack: 350 mL    Jevity 1.2: 544 mL    Lactated Ringers: 1800 mL  Total IN: 2694 mL    OUT:    Bulb (mL): 39 mL    Bulb (mL): 52 mL    Voided (mL): 1650 mL  Total OUT: 1741 mL    Total NET: 953 mL      08 May 2021 07:01  -  09 May 2021 00:17  --------------------------------------------------------  IN:    IV PiggyBack: 100 mL    Jevity 1.2: 672 mL    Lactated Ringers: 150 mL  Total IN: 922 mL    OUT:    Bulb (mL): 35 mL    Bulb (mL): 35 mL    Voided (mL): 950 mL  Total OUT: 1020 mL    Total NET: -98 mL      NEURO  Exam: awake, tracks, follows commands      RESPIRATORY  Exam: tracheostomy c/d/i, on trach collar tolerating, CTA B/L, neck incision c/d/i, neck JONAS sanguinous      CARDIOVASCULAR    Exam: extremities WWP, cook doppler with strong signal  Cardiac Rhythm: normal sinus      GI/NUTRITION  Exam: abdomen soft, nondistended, NGT in place  Diet: NPO      PATIENT CARE ACCESS DEVICES:  [x] Peripheral IV  [ ] Central Venous Line	[ ] R	[ ] L	[ ] IJ	[ ] Fem	[ ] SC	Placed:   [x] Arterial Line		[x] R	[ ] L	[ ] Fem	[x] Rad	[ ] Ax	Placed: 5/6/21  [ ] PICC:					[ ] Mediport  [x] Urinary Catheter, Date Placed: 5/6/21  [x] Necessity of urinary, arterial, and venous catheters discussed

## 2021-05-09 NOTE — PROGRESS NOTE ADULT - SUBJECTIVE AND OBJECTIVE BOX
SUBJECTIVE:  Pt seen & examined at bedside  No acute events overnight  Pain controlled  No complaints  No F/C, N/V, CP/SOB    Vital Signs Last 24 Hrs  T(C): 36.4 (09 May 2021 08:00), Max: 37.2 (08 May 2021 12:00)  T(F): 97.5 (09 May 2021 08:00), Max: 98.9 (08 May 2021 12:00)  HR: 91 (09 May 2021 08:00) (71 - 95)  BP: 135/67 (09 May 2021 08:00) (118/62 - 142/51)  BP(mean): 84 (09 May 2021 08:00) (74 - 119)  RR: 16 (09 May 2021 08:00) (11 - 23)  SpO2: 100% (09 May 2021 08:00) (96% - 100%)    General: NAD  Respiratory: No respiratory distress, stridor, or stertor  HEENT: neck soft/flat. staples in place. incision cdi. neck JONAS ss. NGT in place. 6LPC sutured in place, cuff deflated  OC: intraoral skin paddle normal turgor, color, warm. strong \doppler signal  Ext: L arm incisions are clean, dry and intact   JONAS arm 50, JONAS neck 40                          8.3    5.15  )-----------( 123      ( 09 May 2021 02:29 )             25.7   05-09    148<H>  |  110<H>  |  9   ----------------------------<  111<H>  4.3   |  29  |  0.69    Ca    8.7      09 May 2021 04:54  Phos  3.4     05-09  Mg     2.1     05-09        70F s/p L composite resection alveolar ridge SCCa, marginal mandibulectomy, L SND 1-4, trach, L rad FF, L plate recon w/ implants w/ OMFS 5/6  - unasyn per PRS  - lovenox/asa  - continue TFs  - PT consult/OOBTC  - JONAS left neck and left arm, to bulb suction  - flap checks per PRS - possible q4 today  - doppler per PRS  - monitor fluid status  - home meds  - bowel regimen  - pain control  - possible floor today, per PRS

## 2021-05-09 NOTE — PROGRESS NOTE ADULT - SUBJECTIVE AND OBJECTIVE BOX
Subjective:   Patient seen at bedside this AM. Reports feeling well, without complaints. No acute events overnight.     Plastic Surgery Progress Note (pg [facility]: [id number], [facility]: [phone])    SUBJECTIVE  The patient was seen and examined. No acute events overnight.    OBJECTIVE  ___________________________________________________  VITAL SIGNS / I&O's   Vital Signs Last 24 Hrs  T(C): 36.4 (09 May 2021 08:00), Max: 37.2 (08 May 2021 12:00)  T(F): 97.5 (09 May 2021 08:00), Max: 98.9 (08 May 2021 12:00)  HR: 89 (09 May 2021 09:00) (71 - 95)  BP: 118/72 (09 May 2021 09:00) (118/62 - 142/51)  BP(mean): 83 (09 May 2021 09:00) (74 - 119)  RR: 19 (09 May 2021 09:00) (11 - 23)  SpO2: 99% (09 May 2021 09:00) (96% - 100%)      08 May 2021 07:01  -  09 May 2021 07:00  --------------------------------------------------------  IN:    IV PiggyBack: 600 mL    Jevity 1.2: 768 mL    Lactated Ringers: 150 mL  Total IN: 1518 mL    OUT:    Bulb (mL): 40 mL    Bulb (mL): 50 mL    Voided (mL): 1500 mL  Total OUT: 1590 mL    Total NET: -72 mL        ___________________________________________________  PHYSICAL EXAM    NAD  HEENT: Flap with good color and cap refill, Cook doppler with strong signal, neck soft to palpation with expected postop edema.   LUE: RFFF donor site with mild dried blood as expected, flap well perfused and arm soft, motor and sensation grossly intact.  Drains: SS output as expected    ___________________________________________________  LABS                        8.3    5.15  )-----------( 123      ( 09 May 2021 02:29 )             25.7     09 May 2021 04:54    148    |  110    |  9      ----------------------------<  111    4.3     |  29     |  0.69     Ca    8.7        09 May 2021 04:54  Phos  3.4       09 May 2021 04:54  Mg     2.1       09 May 2021 04:54        CAPILLARY BLOOD GLUCOSE              ___________________________________________________  MICRO  Recent Cultures:    ___________________________________________________  MEDICATIONS  (STANDING):  acetaminophen    Suspension .. 975 milliGRAM(s) Enteral Tube every 6 hours  ampicillin/sulbactam  IVPB 1.5 Gram(s) IV Intermittent every 6 hours  aspirin  chewable 81 milliGRAM(s) Oral daily  BACItracin   Ointment 1 Application(s) Topical two times a day  chlorhexidine 0.12% Liquid 15 milliLiter(s) Oral Mucosa two times a day  enoxaparin Injectable 40 milliGRAM(s) SubCutaneous daily  pantoprazole  Injectable 40 milliGRAM(s) IV Push once  senna 2 Tablet(s) Oral at bedtime  simethicone 80 milliGRAM(s) Chew once    MEDICATIONS  (PRN):  oxyCODONE    IR 5 milliGRAM(s) Oral every 6 hours PRN Moderate Pain (4 - 6)  oxyCODONE    IR 10 milliGRAM(s) Oral every 6 hours PRN Severe Pain (7 - 10)

## 2021-05-09 NOTE — PROGRESS NOTE ADULT - ASSESSMENT
Assessment  69yo F with L alveolar ridge scca s/p L composite resection, marginal mandibulectomy, placement of implants by OMFS, L SND 1-3, L radial forearm free flap 5/6, recovering appropriately.     Plan  - lovenox/asa  - dc soares once OOB  - JONAS left neck and left arm, to bulb suction  - flap checks q4 today  - doppler q4  - ok for transfer to floor from PRS standpoint

## 2021-05-09 NOTE — PROGRESS NOTE ADULT - SUBJECTIVE AND OBJECTIVE BOX
OMFS Progress Note    Interval history  The patient was seen and examined. No acute events overnight. AVSS. Patient denies pain, resting comfortably in chair. Patient cleared by PRS for transfer to floor today.     ICU Vital Signs Last 24 Hrs  T(C): 36.4 (09 May 2021 08:00), Max: 37.2 (08 May 2021 12:00)  T(F): 97.5 (09 May 2021 08:00), Max: 98.9 (08 May 2021 12:00)  HR: 89 (09 May 2021 09:00) (71 - 95)  BP: 118/72 (09 May 2021 09:00) (118/62 - 142/51)  BP(mean): 83 (09 May 2021 09:00) (74 - 119)  ABP: --  ABP(mean): --  RR: 19 (09 May 2021 09:00) (11 - 23)  SpO2: 99% (09 May 2021 09:00) (96% - 100%)    I&O's Detail    08 May 2021 07:01  -  09 May 2021 07:00  --------------------------------------------------------  IN:    IV PiggyBack: 600 mL    Jevity 1.2: 768 mL    Lactated Ringers: 150 mL  Total IN: 1518 mL    OUT:    Bulb (mL): 40 mL    Bulb (mL): 50 mL    Voided (mL): 1500 mL  Total OUT: 1590 mL    Total NET: -72 mL    PE    NAD  HEENT: Flap with good color and cap refill, Cook doppler with strong signal, neck soft to palpation with expected postop edema.   LUE: RFFF donor site with mild dried blood as expected, flap well perfused and arm soft, motor and sensation grossly intact.  Drains: SS output as expected  Intraoral: surgical sites closed and hemostatic. Edema c/w postop state  Neuro: +L V3 paresthesia, unchanged   ___________________________________________________  LABS                        8.3    5.15  )-----------( 123      ( 09 May 2021 02:29 )             25.7     09 May 2021 04:54    148    |  110    |  9      ----------------------------<  111    4.3     |  29     |  0.69     Ca    8.7        09 May 2021 04:54  Phos  3.4       09 May 2021 04:54  Mg     2.1       09 May 2021 04:54  ___________________________________________________  MEDICATIONS  (STANDING):  acetaminophen    Suspension .. 975 milliGRAM(s) Enteral Tube every 6 hours  ampicillin/sulbactam  IVPB 1.5 Gram(s) IV Intermittent every 6 hours  aspirin  chewable 81 milliGRAM(s) Oral daily  BACItracin   Ointment 1 Application(s) Topical two times a day  chlorhexidine 0.12% Liquid 15 milliLiter(s) Oral Mucosa two times a day  enoxaparin Injectable 40 milliGRAM(s) SubCutaneous daily  pantoprazole  Injectable 40 milliGRAM(s) IV Push once  senna 2 Tablet(s) Oral at bedtime  simethicone 80 milliGRAM(s) Chew once    MEDICATIONS  (PRN):  oxyCODONE    IR 5 milliGRAM(s) Oral every 6 hours PRN Moderate Pain (4 - 6)  oxyCODONE    IR 10 milliGRAM(s) Oral every 6 hours PRN Severe Pain (7 - 10)

## 2021-05-09 NOTE — PROGRESS NOTE ADULT - SUBJECTIVE AND OBJECTIVE BOX
Patient seen and examined. Discussed trach change 6LPC to 6CFS. verbal consent obtained.     Trach suctioned. Patient laid supine with shoulder roll. Lido with epi injected at suture sites. Sutures cut. Previous trach removed, stoma examined, well defined, patent. 6CFS replaced with obturator and then exchanged for inner cannula. Secured with 2-0 prolene. PMV placed, patient phonating without issue. Tolerated procedure well. Will start capping trials.

## 2021-05-09 NOTE — PROGRESS NOTE ADULT - ASSESSMENT
Assessment  71yo F with L alveolar ridge scca s/p L composite resection, marginal mandibulectomy, placement of implants by OMFS, L SND 1-3, L radial forearm free flap 5/6, recovering appropriately.     Plan  - Continue unasyn  - Plan of care as per PRS    Shelly Aldrich [facility] [place] [id number]

## 2021-05-09 NOTE — PROGRESS NOTE ADULT - ASSESSMENT
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      PLAN:    NEURO: acute post-op pain control, A&Ox3  - Tylenol ATC  - Dilaudid PRN    HEENT: s/p L composite resection, marginal mandibulectomy, placement of implants by OMFS, L SND 1-3, L radial forearm free flap 5/6  - q2h flap checks w/Cook doppler   - Monitor drain outputs: Neck JONAS x1, L forearm JONAS x1  - peridex added for oral care    RESPIRATORY: s/p tracheostomy 5/6  - tolerating trach collar  - Maintain SAO2>92%    CARDIOVASCULAR:   - MAP goal >65  - c/o chest tightness, EKG NSR     GI/NUTRITION:   - currently on TFs  - NGT confirmed in stomach with CXR  -  on tube feeds, started bowel regimen    GENITOURINARY/RENAL:  - LR@75  - d/c Allen, passed TOV  - Monitor I/Os  - Trend BUN/Cr daily  - Trend electrolytes, replete PRN    HEMATOLOGIC:   -  rectal suppository given, cont with ASA 81   - Trend CBC, Coags daily  - VTE ppx: SCDs,     INFECTIOUS DISEASE:  - Unasyn for perioperative prophylaxis until drains are removed per ENT  - Bacitracin BID to neck and forearm incisions  - Trend WBC, monitor for signs of infection    ENDOCRINE:  - Monitor serum glucose on daily BMP    LINES/TUBES/DRAINS:  - PIVs  - Allen  - R Radial a-line  - NGT  - Tracheostomy  - Neck JONAS x1, L forearm JONAS x1    DISPO: SICU    -------------------------------------------------------------------------------------------------------------------  Critical Care Diagnoses: s/p tracheostomy requiring airway monitoring, s/p flap reconstruction requiring q1h flap checks   ASSESSMENT:  ROMERO MIMS is a 70y Female 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      PLAN:    NEURO: acute post-op pain control, A&Ox3  - Tylenol ATC  - Dilaudid PRN    HEENT: s/p L composite resection, marginal mandibulectomy, placement of implants by OMFS, L SND 1-3, L radial forearm free flap 5/6  - q4h flap checks w/Cook doppler   - Monitor drain outputs: Neck JONAS x1, L forearm JONAS x1  - peridex added for oral care    RESPIRATORY: s/p tracheostomy 5/6  - tolerating trach collar  - Maintain SAO2>92%    CARDIOVASCULAR:   - MAP goal >65  - c/o chest tightness, EKG NSR       GI/NUTRITION:   - currently on TFs  - NGT confirmed in stomach with CXR  -  on tube feeds, started bowel regimen  - protonix 40mg IV x1    GENITOURINARY/RENAL:  - LR@75  - d/c Allen, passed TOV  - Monitor I/Os  - Trend BUN/Cr daily  - Trend electrolytes, replete PRN    HEMATOLOGIC:   -  rectal suppository given, cont with ASA 81   - Trend CBC, Coags daily  - VTE ppx: SCDs,     INFECTIOUS DISEASE:  - Unasyn for perioperative prophylaxis until drains are removed per ENT  - Bacitracin BID to neck and forearm incisions  - Trend WBC, monitor for signs of infection    ENDOCRINE:  - Monitor serum glucose on daily BMP    LINES/TUBES/DRAINS:  - PIVs  - Allen  - NGT  - Tracheostomy  - Neck JONAS x1, L forearm JONAS x1    DISPO: SICU - listed for floor     -------------------------------------------------------------------------------------------------------------------  Critical Care Diagnoses: s/p tracheostomy requiring airway monitoring, s/p flap reconstruction requiring q1h flap checks

## 2021-05-10 LAB
ANION GAP SERPL CALC-SCNC: 6 MMOL/L — LOW (ref 7–14)
BUN SERPL-MCNC: 12 MG/DL — SIGNIFICANT CHANGE UP (ref 7–23)
CALCIUM SERPL-MCNC: 8.1 MG/DL — LOW (ref 8.4–10.5)
CHLORIDE SERPL-SCNC: 111 MMOL/L — HIGH (ref 98–107)
CO2 SERPL-SCNC: 27 MMOL/L — SIGNIFICANT CHANGE UP (ref 22–31)
CREAT SERPL-MCNC: 0.57 MG/DL — SIGNIFICANT CHANGE UP (ref 0.5–1.3)
GLUCOSE SERPL-MCNC: 115 MG/DL — HIGH (ref 70–99)
HCT VFR BLD CALC: 26.6 % — LOW (ref 34.5–45)
HGB BLD-MCNC: 8.5 G/DL — LOW (ref 11.5–15.5)
MAGNESIUM SERPL-MCNC: 1.9 MG/DL — SIGNIFICANT CHANGE UP (ref 1.6–2.6)
MCHC RBC-ENTMCNC: 31.8 PG — SIGNIFICANT CHANGE UP (ref 27–34)
MCHC RBC-ENTMCNC: 32 GM/DL — SIGNIFICANT CHANGE UP (ref 32–36)
MCV RBC AUTO: 99.6 FL — SIGNIFICANT CHANGE UP (ref 80–100)
NRBC # BLD: 0 /100 WBCS — SIGNIFICANT CHANGE UP
NRBC # FLD: 0 K/UL — SIGNIFICANT CHANGE UP
PHOSPHATE SERPL-MCNC: 2.7 MG/DL — SIGNIFICANT CHANGE UP (ref 2.5–4.5)
PLATELET # BLD AUTO: 132 K/UL — LOW (ref 150–400)
POTASSIUM SERPL-MCNC: 3.4 MMOL/L — LOW (ref 3.5–5.3)
POTASSIUM SERPL-SCNC: 3.4 MMOL/L — LOW (ref 3.5–5.3)
RBC # BLD: 2.67 M/UL — LOW (ref 3.8–5.2)
RBC # FLD: 12.1 % — SIGNIFICANT CHANGE UP (ref 10.3–14.5)
SODIUM SERPL-SCNC: 144 MMOL/L — SIGNIFICANT CHANGE UP (ref 135–145)
WBC # BLD: 4.25 K/UL — SIGNIFICANT CHANGE UP (ref 3.8–10.5)
WBC # FLD AUTO: 4.25 K/UL — SIGNIFICANT CHANGE UP (ref 3.8–10.5)

## 2021-05-10 PROCEDURE — 99232 SBSQ HOSP IP/OBS MODERATE 35: CPT

## 2021-05-10 RX ORDER — ONDANSETRON 8 MG/1
4 TABLET, FILM COATED ORAL EVERY 4 HOURS
Refills: 0 | Status: DISCONTINUED | OUTPATIENT
Start: 2021-05-10 | End: 2021-05-13

## 2021-05-10 RX ORDER — ACETAMINOPHEN 500 MG
650 TABLET ORAL EVERY 6 HOURS
Refills: 0 | Status: COMPLETED | OUTPATIENT
Start: 2021-05-10 | End: 2021-05-13

## 2021-05-10 RX ORDER — POTASSIUM CHLORIDE 20 MEQ
40 PACKET (EA) ORAL ONCE
Refills: 0 | Status: COMPLETED | OUTPATIENT
Start: 2021-05-10 | End: 2021-05-10

## 2021-05-10 RX ADMIN — Medication 1000 MILLIGRAM(S): at 00:00

## 2021-05-10 RX ADMIN — Medication 1 APPLICATION(S): at 18:29

## 2021-05-10 RX ADMIN — AMPICILLIN SODIUM AND SULBACTAM SODIUM 100 GRAM(S): 250; 125 INJECTION, POWDER, FOR SUSPENSION INTRAMUSCULAR; INTRAVENOUS at 06:09

## 2021-05-10 RX ADMIN — ENOXAPARIN SODIUM 40 MILLIGRAM(S): 100 INJECTION SUBCUTANEOUS at 12:48

## 2021-05-10 RX ADMIN — AMPICILLIN SODIUM AND SULBACTAM SODIUM 100 GRAM(S): 250; 125 INJECTION, POWDER, FOR SUSPENSION INTRAMUSCULAR; INTRAVENOUS at 18:32

## 2021-05-10 RX ADMIN — Medication 1000 MILLIGRAM(S): at 09:20

## 2021-05-10 RX ADMIN — CHLORHEXIDINE GLUCONATE 15 MILLILITER(S): 213 SOLUTION TOPICAL at 18:29

## 2021-05-10 RX ADMIN — Medication 400 MILLIGRAM(S): at 06:09

## 2021-05-10 RX ADMIN — Medication 81 MILLIGRAM(S): at 13:15

## 2021-05-10 RX ADMIN — CHLORHEXIDINE GLUCONATE 15 MILLILITER(S): 213 SOLUTION TOPICAL at 06:10

## 2021-05-10 RX ADMIN — ONDANSETRON 4 MILLIGRAM(S): 8 TABLET, FILM COATED ORAL at 13:05

## 2021-05-10 RX ADMIN — Medication 650 MILLIGRAM(S): at 19:10

## 2021-05-10 RX ADMIN — Medication 650 MILLIGRAM(S): at 14:50

## 2021-05-10 RX ADMIN — Medication 40 MILLIEQUIVALENT(S): at 09:20

## 2021-05-10 RX ADMIN — Medication 650 MILLIGRAM(S): at 14:06

## 2021-05-10 RX ADMIN — AMPICILLIN SODIUM AND SULBACTAM SODIUM 100 GRAM(S): 250; 125 INJECTION, POWDER, FOR SUSPENSION INTRAMUSCULAR; INTRAVENOUS at 14:06

## 2021-05-10 RX ADMIN — Medication 650 MILLIGRAM(S): at 18:30

## 2021-05-10 RX ADMIN — SENNA PLUS 2 TABLET(S): 8.6 TABLET ORAL at 21:57

## 2021-05-10 RX ADMIN — Medication 1 APPLICATION(S): at 06:09

## 2021-05-10 NOTE — PROGRESS NOTE ADULT - ATTENDING COMMENTS
S/p head/neck dissection and resection with new tracheostomy   Flap appears well, q2 vasc checks  Trach functioning well  Suction requirements moderate but saturating well   Tube feeding started and tolerated   Pain controlled  BMP stable  D/c from SICU tomorrow AM    Arun Montalvo MD  Acute Care Surgery
Electrolytes stable, flap appears viable  Tolerating tube feeding now after educating her that she needs nutrition   Pain control  Trach downsized  Transfer to floor in AM.    Arun Montalvo MD  Acute Care Surgery
I agree with the above history, physical, and plan, which I have reviewed and edited where appropriate.  I agree with notes/assessment and detailed interval history of the health care providers on my service.  The patient is in SICU with diagnosis mentioned in the note.    The SICU team has a constant risk benefit analyzes discussion and coordinating care with the primary team, all consultants, House Staff and PA's.  I was physically present for the key portions of the evaluation and management (E/M) service provided.  70 y.o. female with PMH of L alveolar ridge stem cell cancer who underwent planned L composite resection, marginal mandibulectomy, placement of implants in SICU post-operatively for flap checks and airway monitoring of new tracheostomy.    I have personally examined the patient, reviewed data and laboratory tests/x-rays and all pertinent electronic images.    NEUROLOGY  Exam; oriented x3, no focal deficits.  RESPIRATORY  Exam: Lungs clear   CARDIOVASCULAR  Exam: S1, S2.  RR  GI/NUTRITION  Exam: Abdomen soft, Non-tender, Non-distended.    VASCULAR  Exam: Extremities warm    The plan is specified below:    NEURO:   - Tylenol ATC  - Dilaudid   HEENT:   - q4h flap checks w/Cook doppler   - Monitor drain outputs: Neck JONAS x1, L forearm JONAS x1  -RESPIRATORY:   - tolerating trach collar  CARDIOVASCULAR:   - MAP goal >65  GI/NUTRITION:   - currently on TFs  - protonix 40mg IV x1  GENITOURINARY/RENAL:  - LR@75  HEMATOLOGIC:   - cont with ASA 81   - VTE ppx: SCDs,   INFECTIOUS DISEASE:  - Unasyn   ENDOCRINE:  - Monitor serum glucose on daily BMP    DISPO: SICU - listed for floor   Critical Care Diagnoses: s/p tracheostomy requiring airway monitoring, s/p flap reconstruction requiring q1h flap checks
S/p head/neck dissection and resection with new tracheostomy   Flap appears well, q1 vasc checks  Trach functioning well  Suction requirements moderate but saturating well   Pain controlled  BMP stable    Arun Montalvo MD  Acute Care Surgery

## 2021-05-10 NOTE — PROGRESS NOTE ADULT - ASSESSMENT
70y Female 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  Patient is now on q4h flap checks and is floor eligible.    PLAN:    NEURO: acute post-op pain control, A&Ox3  - Tylenol ATC  - Dilaudid PRN    HEENT: s/p L composite resection, marginal mandibulectomy, placement of implants by OMFS, L SND 1-3, L radial forearm free flap 5/6  - q4h flap checks w/Cook doppler   - Monitor drain outputs: Neck JONAS x1, L forearm JONAS x1  - peridex added for oral care    RESPIRATORY: s/p tracheostomy 5/6  - tolerating trach collar  - Maintain SAO2>92%    CARDIOVASCULAR:   - MAP goal >65  - c/o chest tightness, EKG NSR     GI/NUTRITION:   - currently on TFs  - NGT confirmed in stomach with CXR  -  on tube feeds, started bowel regimen  - protonix 40mg IV x1    GENITOURINARY/RENAL:  - LR@75  - d/c Allen, passed TOV  - Monitor I/Os  - Trend BUN/Cr daily  - Trend electrolytes, replete PRN    HEMATOLOGIC:   -  rectal suppository given, cont with ASA 81   - Trend CBC, Coags daily  - VTE ppx: SCDs, lovenox BID    INFECTIOUS DISEASE:  - Unasyn for perioperative prophylaxis until drains are removed per ENT  - Bacitracin BID to neck and forearm incisions  - Trend WBC, monitor for signs of infection    ENDOCRINE:  - Monitor serum glucose on daily BMP    LINES/TUBES/DRAINS:  - PIVs  - Allen  - NGT  - Tracheostomy  - Neck JONAS x1, L forearm JONAS x1    DISPO: SICU - listed for floor  HPI: 70 y.o. female with PMH of L alveolar ridge stem cell cancer who underwent planned L composite resection, marginal mandibulectomy, placement of implants by OMFS, L SND 1-3, L radial forearm free flap 5/6 (, , 1L IVF, 500cc albumin), originally admitted to SICU post-operatively for q1h flap checks and airway monitoring of new tracheostomy.        PLAN:    NEURO: acute post-op pain control, A&Ox3  - Tylenol ATC  - Dilaudid PRN    HEENT: s/p L composite resection, marginal mandibulectomy, placement of implants by OMFS, L SND 1-3, L radial forearm free flap 5/6  - q4h flap checks w/Cook doppler   - Monitor drain outputs: Neck JONAS x1, L forearm JONAS x1  - peridex added for oral care    RESPIRATORY: s/p tracheostomy 5/6  - tolerating trach collar  - Maintain SAO2>92%    CARDIOVASCULAR:   - MAP goal >65  - c/o chest tightness, EKG NSR       GI/NUTRITION:   - currently on TFs  - NGT confirmed in stomach with CXR  -  on tube feeds, started bowel regimen  - protonix 40mg IV x1    GENITOURINARY/RENAL:  - LR@75  - d/c Allen, passed TOV  - Monitor I/Os  - Trend BUN/Cr daily  - Trend electrolytes, replete PRN    HEMATOLOGIC:   - cont with ASA 81   - Trend CBC, Coags daily  - VTE ppx: SCDs,     INFECTIOUS DISEASE:  - Unasyn for perioperative prophylaxis until drains are removed per ENT  - Bacitracin BID to neck and forearm incisions  - Trend WBC, monitor for signs of infection    ENDOCRINE:  - Monitor serum glucose on daily BMP    LINES/TUBES/DRAINS:  - PIVs  - Allen  - NGT  - Tracheostomy  - Neck JONAS x1, L forearm JONAS x1    DISPO: SICU - listed for floor     -------------------------------------------------------------------------------------------------------------------  Critical Care Diagnoses: s/p tracheostomy requiring airway monitoring, s/p flap reconstruction requiring q1h flap checks

## 2021-05-10 NOTE — PROGRESS NOTE ADULT - ASSESSMENT
69yo F with L alveolar ridge scca s/p L composite resection, marginal mandibulectomy, placement of implants by OMFS, L SND 1-3, L radial forearm free flap 5/6, recovering appropriately.     Plan  - Continue unasyn  - appreciate f/u with ENT and plastics  - Plan of care as per PRS

## 2021-05-10 NOTE — PROGRESS NOTE ADULT - SUBJECTIVE AND OBJECTIVE BOX
OMFS Progress Note    Interval history  The patient was seen and examined. No acute events overnight. AVSS. Patient denies pain, resting comfortably. Patient cleared by PRS for transfer to floor today.     ICU Vital Signs Last 24 Hrs  T(C): 36.6 (10 May 2021 04:00), Max: 36.6 (10 May 2021 04:00)  T(F): 97.9 (10 May 2021 04:00), Max: 97.9 (10 May 2021 04:00)  HR: 69 (10 May 2021 04:00) (67 - 91)  BP: 135/72 (10 May 2021 04:00) (118/72 - 135/72)  BP(mean): 84 (10 May 2021 04:00) (77 - 86)  ABP: --  ABP(mean): --  RR: 14 (10 May 2021 04:00) (12 - 19)  SpO2: 100% (10 May 2021 04:00) (97% - 100%)    MEDICATIONS  (STANDING):  ampicillin/sulbactam  IVPB 1.5 Gram(s) IV Intermittent every 6 hours  aspirin  chewable 81 milliGRAM(s) Oral daily  BACItracin   Ointment 1 Application(s) Topical two times a day  chlorhexidine 0.12% Liquid 15 milliLiter(s) Oral Mucosa two times a day  enoxaparin Injectable 40 milliGRAM(s) SubCutaneous daily  senna 2 Tablet(s) Oral at bedtime    MEDICATIONS  (PRN):  oxyCODONE    IR 5 milliGRAM(s) Oral every 6 hours PRN Moderate Pain (4 - 6)  oxyCODONE    IR 10 milliGRAM(s) Oral every 6 hours PRN Severe Pain (7 - 10)        PE    NAD  HEENT: Flap with good color and cap refill, Cook doppler with strong signal, neck soft to palpation with expected postop edema. trach stable  LUE: RFFF donor site hemostatic, flap well perfused and arm soft, motor and sensation grossly intact.  Drains: SS output as expected  Intraoral: surgical sites closed and hemostatic. Edema c/w postop state  Neuro: +L V3 paresthesia, unchanged       ___________________________________________________  LABS:              8.5    4.25  )-----------( 132      ( 10 May 2021 06:29 )             26.6     05-10    144  |  111<H>  |  12  ----------------------------<  115<H>  3.4<L>   |  27  |  0.57    Ca    8.1<L>      10 May 2021 06:29  Phos  2.7     05-10  Mg     1.9     05-10          09 May 2021 07:01  -  10 May 2021 07:00  --------------------------------------------------------  IN:    IV PiggyBack: 500 mL    Jevity 1.2: 832 mL  Total IN: 1332 mL  OUT:    Bulb (mL): 45 mL    Bulb (mL): 20 mL    Voided (mL): 1600 mL  Total OUT: 1665 mL  Total NET: -333 mL      I&O's Summary    09 May 2021 07:01  -  10 May 2021 07:00  --------------------------------------------------------  IN: 1332 mL / OUT: 1665 mL / NET: -333 mL      ___________________________________________________

## 2021-05-10 NOTE — PROGRESS NOTE ADULT - SUBJECTIVE AND OBJECTIVE BOX
SICU Daily Progress Note  =====================================================  HPI:   70 y.o. female with PMH of L alveolar ridge stem cell cancer who underwent planned L composite resection, marginal mandibulectomy, placement of implants by OMFS, L SND 1-3, L radial forearm free flap 5/6 (, , 1L IVF, 500cc albumin), admitted to SICU post-operatively for q1h flap checks and airway monitoring of new tracheostomy.      24 HR Events:  No significant events    MEDICATIONS:   --------------------------------------------------------------------------------------  Neurologic Medications  acetaminophen  IVPB .. 1000 milliGRAM(s) IV Intermittent every 6 hours  oxyCODONE    IR 5 milliGRAM(s) Oral every 6 hours PRN Moderate Pain (4 - 6)  oxyCODONE    IR 10 milliGRAM(s) Oral every 6 hours PRN Severe Pain (7 - 10)    Respiratory Medications    Cardiovascular Medications    Gastrointestinal Medications  senna 2 Tablet(s) Oral at bedtime    Genitourinary Medications    Hematologic/Oncologic Medications  aspirin  chewable 81 milliGRAM(s) Oral daily  enoxaparin Injectable 40 milliGRAM(s) SubCutaneous daily    Antimicrobial/Immunologic Medications  ampicillin/sulbactam  IVPB 1.5 Gram(s) IV Intermittent every 6 hours    Endocrine/Metabolic Medications    Topical/Other Medications  BACItracin   Ointment 1 Application(s) Topical two times a day  chlorhexidine 0.12% Liquid 15 milliLiter(s) Oral Mucosa two times a day    --------------------------------------------------------------------------------------    VITAL SIGNS, INS/OUTS (last 24 hours):  --------------------------------------------------------------------------------------    05-08-21 @ 07:01 - 05-09-21 @ 07:00  --------------------------------------------------------  IN: 1518 mL / OUT: 1590 mL / NET: -72 mL    05-09-21 @ 07:01  -  05-10-21 @ 00:44  --------------------------------------------------------  IN: 508 mL / OUT: 1140 mL / NET: -632 mL      --------------------------------------------------------------------------------------    EXAM  NEUROLOGY  Exam: Normal, NAD, alert, oriented x3, no focal deficits.    RESPIRATORY  Exam: Lungs clear to auscultation, Normal expansion/effort.   Mechanical Ventilation:     CARDIOVASCULAR  Exam: S1, S2.  Regular rate and rhythm.       GI/NUTRITION  Exam: Abdomen soft, Non-tender, Non-distended.      VASCULAR  Exam: Extremities warm, pink, well-perfused.    MUSCULOSKELETAL  Exam: All extremities moving spontaneously without limitations.    HEMATOLOGIC  [x] VTE Prophylaxis: aspirin  chewable 81 milliGRAM(s) Oral daily  enoxaparin Injectable 40 milliGRAM(s) SubCutaneous daily        LABS  --------------------------------------------------------------------------------------    T(C): 36.4 (05-09-21 @ 16:00), Max: 36.4 (05-09-21 @ 08:00)  HR: 86 (05-09-21 @ 16:00) (71 - 91)  BP: 129/71 (05-09-21 @ 16:00) (118/72 - 136/67)  RR: 17 (05-09-21 @ 16:00) (11 - 23)  SpO2: 100% (05-09-21 @ 16:00) (96% - 100%)    --------------------------------------------------------------------------------------

## 2021-05-10 NOTE — PROGRESS NOTE ADULT - SUBJECTIVE AND OBJECTIVE BOX
Subjective:   Patient seen at bedside this AM. Reports feeling well, without complaints. Eager to leave ICU. Able to speak over trach.    Objective:  Vital Signs  T(C): 36.6 (05-10 @ 04:00), Max: 36.6 (05-10 @ 04:00)  HR: 69 (05-10 @ 04:00) (67 - 91)  BP: 135/72 (05-10 @ 04:00) (118/72 - 135/72)  RR: 14 (05-10 @ 04:00) (12 - 19)  SpO2: 100% (05-10 @ 04:00) (97% - 100%)  05-08-21 @ 07:01  -  05-09-21 @ 07:00  --------------------------------------------------------  IN:  Total IN: 0 mL    OUT:    Bulb (mL): 40 mL    Bulb (mL): 50 mL    Voided (mL): 1500 mL  Total OUT: 1590 mL    Total NET: -1590 mL      05-09-21 @ 07:01  -  05-10-21 @ 06:59  --------------------------------------------------------  IN:  Total IN: 0 mL    OUT:    Bulb (mL): 45 mL    Bulb (mL): 20 mL    Voided (mL): 1600 mL  Total OUT: 1665 mL    Total NET: -1665 mL      PHYSICAL EXAM    NAD  HEENT: Flap with good color and cap refill, Cook doppler with strong signal, neck soft to palpation with expected postop edema.   LUE: RFFF donor site with mild dried blood as expected, flap well perfused and arm soft, motor and sensation grossly intact.  Drains: SS output as expected    Labs:                        8.5    4.25  )-----------( 132      ( 10 May 2021 06:29 )             26.6   05-09    148<H>  |  110<H>  |  9   ----------------------------<  111<H>  4.3   |  29  |  0.69    Ca    8.7      09 May 2021 04:54  Phos  3.4     05-09  Mg     2.1     05-09      CAPILLARY BLOOD GLUCOSE          Medications:   MEDICATIONS  (STANDING):  ampicillin/sulbactam  IVPB 1.5 Gram(s) IV Intermittent every 6 hours  aspirin  chewable 81 milliGRAM(s) Oral daily  BACItracin   Ointment 1 Application(s) Topical two times a day  chlorhexidine 0.12% Liquid 15 milliLiter(s) Oral Mucosa two times a day  enoxaparin Injectable 40 milliGRAM(s) SubCutaneous daily  senna 2 Tablet(s) Oral at bedtime    MEDICATIONS  (PRN):  oxyCODONE    IR 5 milliGRAM(s) Oral every 6 hours PRN Moderate Pain (4 - 6)  oxyCODONE    IR 10 milliGRAM(s) Oral every 6 hours PRN Severe Pain (7 - 10)        Assessment  71yo F with L alveolar ridge scca s/p L composite resection, marginal mandibulectomy, placement of implants by OMFS, L SND 1-3, L radial forearm free flap 5/6, recovering appropriately.     Plan  - lovenox/asa  - JONAS left neck and left arm, to bulb suction  - flap checks q4 today  - doppler q4  - ok for transfer to floor from PRS standpoint       Annamarie Grissom, PGY-1  Plastic and Reconstructive Surgery  [id number]

## 2021-05-10 NOTE — PROGRESS NOTE ADULT - SUBJECTIVE AND OBJECTIVE BOX
SUBJECTIVE:  Pt seen & examined at bedside  No acute events overnight  Pain controlled  No complaints  No F/C, N/V, CP/SOB    Vital Signs Last 24 Hrs  T(C): 36.6 (10 May 2021 04:00), Max: 36.6 (10 May 2021 04:00)  T(F): 97.9 (10 May 2021 04:00), Max: 97.9 (10 May 2021 04:00)  HR: 69 (10 May 2021 04:00) (67 - 91)  BP: 135/72 (10 May 2021 04:00) (118/72 - 135/72)  BP(mean): 84 (10 May 2021 04:00) (77 - 86)  RR: 14 (10 May 2021 04:00) (12 - 19)  SpO2: 100% (10 May 2021 04:00) (97% - 100%)    General: NAD  Respiratory: No respiratory distress, stridor, or stertor  HEENT: neck soft/flat. staples in place. incision cdi. neck JONAS ss. NGT in place. 6LPC sutured in place, cuff deflated  OC: intraoral skin paddle normal turgor, color, warm. strong doppler signal  Ext: L arm incisions are clean, dry and intact   JONAS arm 45, JONAS neck 20                LABS:                         8.5    4.25  )-----------( 132      ( 10 May 2021 06:29 )             26.6     05-10    x   |  x   |  12  ----------------------------<  115<H>  x    |  27  |  0.57    Ca    8.1<L>      10 May 2021 06:29  Phos  3.4     05-09  Mg     1.9     05-10                    RADIOLOGY, EKG & ADDITIONAL TESTS: Reviewed.       70F s/p L composite resection alveolar ridge SCCa, marginal mandibulectomy, L SND 1-4, trach, L rad FF, L plate recon w/ implants w/ OMFS 5/6  - floor today  - f/u nutrition  - unasyn per PRS  - lovenox/asa  - continue TFs, once on floor transition to bolus feeds  - PT consult/OOBTC  - JONAS left neck and left arm, to bulb suction  - flap checks per PRS - q4 today once on floor  - doppler per PRS  - monitor fluid status  - home meds  - bowel regimen  - pain control

## 2021-05-11 ENCOUNTER — TRANSCRIPTION ENCOUNTER (OUTPATIENT)
Age: 71
End: 2021-05-11

## 2021-05-11 DIAGNOSIS — Z29.9 ENCOUNTER FOR PROPHYLACTIC MEASURES, UNSPECIFIED: ICD-10-CM

## 2021-05-11 DIAGNOSIS — D64.9 ANEMIA, UNSPECIFIED: ICD-10-CM

## 2021-05-11 LAB
ANION GAP SERPL CALC-SCNC: 9 MMOL/L — SIGNIFICANT CHANGE UP (ref 7–14)
BUN SERPL-MCNC: 13 MG/DL — SIGNIFICANT CHANGE UP (ref 7–23)
CALCIUM SERPL-MCNC: 8.3 MG/DL — LOW (ref 8.4–10.5)
CHLORIDE SERPL-SCNC: 107 MMOL/L — SIGNIFICANT CHANGE UP (ref 98–107)
CO2 SERPL-SCNC: 26 MMOL/L — SIGNIFICANT CHANGE UP (ref 22–31)
CREAT SERPL-MCNC: 0.58 MG/DL — SIGNIFICANT CHANGE UP (ref 0.5–1.3)
GLUCOSE SERPL-MCNC: 99 MG/DL — SIGNIFICANT CHANGE UP (ref 70–99)
HCT VFR BLD CALC: 30 % — LOW (ref 34.5–45)
HGB BLD-MCNC: 9.9 G/DL — LOW (ref 11.5–15.5)
MAGNESIUM SERPL-MCNC: 1.9 MG/DL — SIGNIFICANT CHANGE UP (ref 1.6–2.6)
MCHC RBC-ENTMCNC: 31.6 PG — SIGNIFICANT CHANGE UP (ref 27–34)
MCHC RBC-ENTMCNC: 33 GM/DL — SIGNIFICANT CHANGE UP (ref 32–36)
MCV RBC AUTO: 95.8 FL — SIGNIFICANT CHANGE UP (ref 80–100)
NRBC # BLD: 0 /100 WBCS — SIGNIFICANT CHANGE UP
NRBC # FLD: 0 K/UL — SIGNIFICANT CHANGE UP
PHOSPHATE SERPL-MCNC: 3.3 MG/DL — SIGNIFICANT CHANGE UP (ref 2.5–4.5)
PLATELET # BLD AUTO: 199 K/UL — SIGNIFICANT CHANGE UP (ref 150–400)
POTASSIUM SERPL-MCNC: 3.9 MMOL/L — SIGNIFICANT CHANGE UP (ref 3.5–5.3)
POTASSIUM SERPL-SCNC: 3.9 MMOL/L — SIGNIFICANT CHANGE UP (ref 3.5–5.3)
RBC # BLD: 3.13 M/UL — LOW (ref 3.8–5.2)
RBC # FLD: 12.1 % — SIGNIFICANT CHANGE UP (ref 10.3–14.5)
SARS-COV-2 RNA SPEC QL NAA+PROBE: SIGNIFICANT CHANGE UP
SODIUM SERPL-SCNC: 142 MMOL/L — SIGNIFICANT CHANGE UP (ref 135–145)
WBC # BLD: 4.87 K/UL — SIGNIFICANT CHANGE UP (ref 3.8–10.5)
WBC # FLD AUTO: 4.87 K/UL — SIGNIFICANT CHANGE UP (ref 3.8–10.5)

## 2021-05-11 PROCEDURE — 99223 1ST HOSP IP/OBS HIGH 75: CPT

## 2021-05-11 RX ORDER — POLYETHYLENE GLYCOL 3350 17 G/17G
17 POWDER, FOR SOLUTION ORAL DAILY
Refills: 0 | Status: DISCONTINUED | OUTPATIENT
Start: 2021-05-11 | End: 2021-05-13

## 2021-05-11 RX ADMIN — Medication 650 MILLIGRAM(S): at 17:46

## 2021-05-11 RX ADMIN — AMPICILLIN SODIUM AND SULBACTAM SODIUM 100 GRAM(S): 250; 125 INJECTION, POWDER, FOR SUSPENSION INTRAMUSCULAR; INTRAVENOUS at 00:05

## 2021-05-11 RX ADMIN — Medication 650 MILLIGRAM(S): at 06:20

## 2021-05-11 RX ADMIN — AMPICILLIN SODIUM AND SULBACTAM SODIUM 100 GRAM(S): 250; 125 INJECTION, POWDER, FOR SUSPENSION INTRAMUSCULAR; INTRAVENOUS at 19:11

## 2021-05-11 RX ADMIN — Medication 650 MILLIGRAM(S): at 12:46

## 2021-05-11 RX ADMIN — Medication 650 MILLIGRAM(S): at 00:05

## 2021-05-11 RX ADMIN — SENNA PLUS 2 TABLET(S): 8.6 TABLET ORAL at 22:50

## 2021-05-11 RX ADMIN — CHLORHEXIDINE GLUCONATE 15 MILLILITER(S): 213 SOLUTION TOPICAL at 06:20

## 2021-05-11 RX ADMIN — Medication 1 APPLICATION(S): at 17:53

## 2021-05-11 RX ADMIN — Medication 1 APPLICATION(S): at 06:19

## 2021-05-11 RX ADMIN — Medication 650 MILLIGRAM(S): at 00:35

## 2021-05-11 RX ADMIN — AMPICILLIN SODIUM AND SULBACTAM SODIUM 100 GRAM(S): 250; 125 INJECTION, POWDER, FOR SUSPENSION INTRAMUSCULAR; INTRAVENOUS at 12:02

## 2021-05-11 RX ADMIN — CHLORHEXIDINE GLUCONATE 15 MILLILITER(S): 213 SOLUTION TOPICAL at 17:53

## 2021-05-11 RX ADMIN — AMPICILLIN SODIUM AND SULBACTAM SODIUM 100 GRAM(S): 250; 125 INJECTION, POWDER, FOR SUSPENSION INTRAMUSCULAR; INTRAVENOUS at 06:20

## 2021-05-11 RX ADMIN — Medication 650 MILLIGRAM(S): at 17:53

## 2021-05-11 RX ADMIN — Medication 81 MILLIGRAM(S): at 12:02

## 2021-05-11 RX ADMIN — Medication 650 MILLIGRAM(S): at 06:50

## 2021-05-11 RX ADMIN — Medication 650 MILLIGRAM(S): at 12:01

## 2021-05-11 RX ADMIN — ENOXAPARIN SODIUM 40 MILLIGRAM(S): 100 INJECTION SUBCUTANEOUS at 12:02

## 2021-05-11 NOTE — DISCHARGE NOTE PROVIDER - CARE PROVIDER_API CALL
Yonis Hernández)  [facility]; Otolaryngology  [address] [place] [address]  Phone: [phone]  Fax: [phone]  Follow Up Time:    Yonis Hernández)  [facility]; Otolaryngology  [address] [place] [address]  Phone: [phone]  Fax: [phone]  Follow Up Time:     William Dill)  Otolaryngology  [address] [place] [address]  Phone: [phone]  Fax: [phone]  Follow Up Time:     Jason Mosqueda (DDS; MD)  OralMaxillofacial Surgery  [address] [place] [address]  Phone: [phone]  Fax: [phone]  Follow Up Time:

## 2021-05-11 NOTE — DISCHARGE NOTE PROVIDER - NSDCMRMEDTOKEN_GEN_ALL_CORE_FT
no home medication:    aspirin 81 mg oral tablet, chewable: 1 tab(s) orally once a day  bacitracin 500 units/g topical ointment: 1 application topically 2 times a day  oxyCODONE 5 mg oral tablet: 1 tab(s) orally every 6 hours, As needed, Moderate Pain (4 - 6) MDD:4   aspirin 81 mg oral delayed release tablet: 1 tab(s) orally once a day   aspirin 81 mg oral tablet, chewable: 1 tab(s) orally once a day  bacitracin 500 units/g topical ointment: 1 application topically 2 times a day  oxyCODONE 5 mg oral tablet: 1 tab(s) orally every 6 hours, As needed, Moderate Pain (4 - 6) MDD:4

## 2021-05-11 NOTE — CONSULT NOTE ADULT - PROBLEM SELECTOR RECOMMENDATION 9
S/p L composite resection, marginal mandibulectomy, placement of implants by OMFS, tracheostomy, L SND 1-3, L radial forearm free flap 5/6  -Biopsy on 4/2 consistent with invasive SCC  -S/p trach decannulation on 5/11   -Wound care per ENT and plastics  -Currently on Unasyn post-op  -Pain control per primary team  -Last BM 6 days ago, would add Miralax  -On tube feeds via NGT, plan for cineesophagogram   -Will need radiographs with dental prior to d/c

## 2021-05-11 NOTE — DISCHARGE NOTE PROVIDER - NSDCFUADDINST_GEN_ALL_CORE_FT
keep tracheostomy stoma covered with dry gauze and tape in place.   Forearm: cover with xeroform followed by Abd then ace wrap in place. Change every 48 hours.  keep tracheostomy stoma covered with dry gauze and tape in place.   Forearm: apply bacitracin to arm and neck incision twice a day    Activity: No heavy lifting or strenuous activity until your follow-up appointment. Walk as tolerated.    Call office for fever >101.5 or redness or drainage from wound.  keep tracheostomy stoma covered with dry gauze and tape in place.     Forearm: apply bacitracin to arm and neck incision twice a day    Activity: No heavy lifting or strenuous activity until your follow-up appointment. Walk as tolerated.    Call office for fever >101.5 or redness or drainage from wound.

## 2021-05-11 NOTE — CONSULT NOTE ADULT - PROBLEM SELECTOR RECOMMENDATION 2
Normocytic anemia, likely combination of anemia of chronic disease in setting of neoplasm and post-op blood loss  -H/H stable, continue to monitor  -Check iron studies, ferritin, Vitamin B12, folate levels  -No signs of bleeding

## 2021-05-11 NOTE — DISCHARGE NOTE PROVIDER - HOSPITAL COURSE
70 year old female S/P Left mandibulectomy, tracheostomy tube placement, left radial forearm free flap and neck dissection. Patient was observed for flap checks in SICU and then transferred to [address] [place] [address]. Tracheostomy was capped and then decannulated. Left neck JONAS was removed. Swallow evaluation was done... Physical therapy evaluated patient and cleared for discharge home.... 70 year old female S/P Left mandibulectomy, tracheostomy tube placement, left radial forearm free flap and neck dissection. Patient was observed for flap checks in SICU and then transferred to [address] [place] [address]. Tracheostomy was capped and then decannulated. Left neck JONAS was removed. Swallow evaluation was done on 5/12/21 and SLP recommended a mechanical soft diet. NG feeding tube was removed. Physical therapy evaluated patient and cleared for discharge home. All prescriptions were sent to pharmacy agreed on with the patient.

## 2021-05-11 NOTE — DISCHARGE NOTE PROVIDER - CARE PROVIDERS DIRECT ADDRESSES
[email or web address]_Unknown

## 2021-05-11 NOTE — CONSULT NOTE ADULT - REASON FOR ADMISSION
glossectomy, mandibular resection, neck dissection, trach, recon plate, dental implant placement
glossectomy, mandibular resection, neck dissection, trach, recon plate, dental implant placement

## 2021-05-11 NOTE — PROGRESS NOTE ADULT - SUBJECTIVE AND OBJECTIVE BOX
SUBJECTIVE:  Pt seen & examined at bedside  No acute events overnight  Pain controlled  No complaints  No F/C, N/V, CP/SOB    Vital Signs Last 24 Hrs  T(C): 37.1 (11 May 2021 06:00), Max: 37.1 (11 May 2021 06:00)  T(F): 98.8 (11 May 2021 06:00), Max: 98.8 (11 May 2021 06:00)  HR: 87 (11 May 2021 06:00) (76 - 98)  BP: 107/62 (11 May 2021 06:00) (107/62 - 129/65)  BP(mean): --  RR: 17 (11 May 2021 06:00) (15 - 18)  SpO2: 95% (11 May 2021 06:00) (95% - 100%)    General: NAD  Respiratory: No respiratory distress, stridor, or stertor  HEENT: neck soft/flat. staples in place. incision cdi. neck JONAS ss. NGT in place. 6LPC sutured in place, cuff deflated, capped  OC: intraoral skin paddle normal turgor, color, warm. strong doppler signal  Ext: L arm incisions are clean, dry and intact   JONAS arm 40    LABS:                         8.5    4.25  )-----------( 132      ( 10 May 2021 06:29 )             26.6     05-10    144  |  111<H>  |  12  ----------------------------<  115<H>  3.4<L>   |  27  |  0.57    Ca    8.1<L>      10 May 2021 06:29  Phos  2.7     05-10  Mg     1.9     05-10        RADIOLOGY, EKG & ADDITIONAL TESTS: Reviewed.       70F s/p L composite resection alveolar ridge SCCa, marginal mandibulectomy, L SND 1-4, trach, L rad FF, L plate recon w/ implants w/ OMFS 5/6  - unasyn per OMFS  - possible decannulation today  - lovenox/asa  - on bolus feeds  - f/u PT consult/OOBTC  - JONAS left arm, to bulb suction  - flap checks per PRS  - doppler per PRS  - home meds  - bowel regimen  - pain control

## 2021-05-11 NOTE — PROGRESS NOTE ADULT - ASSESSMENT
71yo F with L alveolar ridge scca s/p L composite resection, marginal mandibulectomy, placement of implants by OMFS, L SND 1-3, L radial forearm free flap 5/6, recovering appropriately.     Plan  - Continue unasyn  - appreciate f/u with ENT and plastics  - Plan of care as per PRS  - plan for decannulation with primary team today, will need Panoramic radiograph at Dental/OMFS clinic prior to discharge

## 2021-05-11 NOTE — PROGRESS NOTE ADULT - SUBJECTIVE AND OBJECTIVE BOX
FS Progress Note    Interval history  The patient was seen and examined. No acute events overnight. AVSS. Patient denies pain, resting comfortably. Patient cleared by PRS for transfer to floor today.     Vital Signs Last 24 Hrs  T(C): 37.1 (11 May 2021 06:00), Max: 37.1 (11 May 2021 06:00)  T(F): 98.8 (11 May 2021 06:00), Max: 98.8 (11 May 2021 06:00)  HR: 87 (11 May 2021 06:00) (76 - 98)  BP: 107/62 (11 May 2021 06:00) (107/62 - 129/65)  BP(mean): --  RR: 17 (11 May 2021 06:00) (15 - 18)  SpO2: 95% (11 May 2021 06:00) (95% - 100%)    MEDICATIONS  (STANDING):  acetaminophen    Suspension .. 650 milliGRAM(s) Oral every 6 hours  ampicillin/sulbactam  IVPB 1.5 Gram(s) IV Intermittent every 6 hours  aspirin  chewable 81 milliGRAM(s) Oral daily  BACItracin   Ointment 1 Application(s) Topical two times a day  chlorhexidine 0.12% Liquid 15 milliLiter(s) Oral Mucosa two times a day  enoxaparin Injectable 40 milliGRAM(s) SubCutaneous daily  senna 2 Tablet(s) Oral at bedtime    MEDICATIONS  (PRN):  ondansetron Injectable 4 milliGRAM(s) IV Push every 4 hours PRN Nausea and/or Vomiting  oxyCODONE    IR 5 milliGRAM(s) Oral every 6 hours PRN Moderate Pain (4 - 6)  oxyCODONE    IR 10 milliGRAM(s) Oral every 6 hours PRN Severe Pain (7 - 10)      PE    NAD  HEENT: Flap with good color and cap refill, Cook doppler with strong signal, neck soft to palpation with expected postop edema. trach stable  LUE: RFFF donor site hemostatic, flap well perfused and arm soft, motor and sensation grossly intact.  Drains: SS output as expected, removed  Intraoral: surgical sites closed and hemostatic. Edema c/w postop state  Neuro: +L V3 paresthesia, unchanged       ___________________________________________________  LABS:                           8.5    4.25  )-----------( 132      ( 10 May 2021 06:29 )             26.6     05-10    144  |  111<H>  |  12  ----------------------------<  115<H>  3.4<L>   |  27  |  0.57    Ca    8.1<L>      10 May 2021 06:29  Phos  2.7     05-10  Mg     1.9     05-10          10 May 2021 07:01  -  11 May 2021 07:00  --------------------------------------------------------  IN:    IV PiggyBack: 100 mL    Jevity 1.2: 1296 mL  Total IN: 1396 mL  OUT:    Bulb (mL): 25 mL    Bulb (mL): 40 mL    Voided (mL): 1550 mL  Total OUT: 1615 mL  Total NET: -219 mL      I&O's Summary    10 May 2021 07:01  -  11 May 2021 07:00  --------------------------------------------------------  IN: 1396 mL / OUT: 1615 mL / NET: -219 mL    ___________________________________________________

## 2021-05-11 NOTE — DISCHARGE NOTE PROVIDER - PROVIDER TOKENS
PROVIDER:[TOKEN:[[id number]]] PROVIDER:[TOKEN:[[id number]]],PROVIDER:[TOKEN:[[id number]]],PROVIDER:[TOKEN:[[id number]]]

## 2021-05-11 NOTE — CONSULT NOTE ADULT - SUBJECTIVE AND OBJECTIVE BOX
SICU CONSULT NOTE    HPI  ROMERO MIMS is a 70y Female hx L alveolar ridge stem cell ca who underwent planned L composite resection, marginal mandibulectomy, placement of implants by OMFS, L SND 1-3, L radial forearm free flap 5/6 (, , 1L IVF, 500cc albumin), admitted to SICU post-operatively for q1h flap checks and airway monitoring of new tracheostomy.      PAST MEDICAL HISTORY: Nonrheumatic aortic valve disorder    H/O varicose veins    Aortic insufficiency    Malignant neoplasm of gingiva        PAST SURGICAL HISTORY: H/O varicose vein stripping    Malignant neoplasm of gum        FAMILY HISTORY: FH: lung cancer (Father, Sibling)        SOCIAL HISTORY: denies tobacco use, endorses occasional alcohol use    CODE STATUS: full code    HOME MEDICATIONS:  no home medication:  (05 May 2021 09:29)      ALLERGIES: No Known Allergies      VITAL SIGNS:  ICU Vital Signs Last 24 Hrs  T(C): 36.4 (06 May 2021 05:52), Max: 36.4 (06 May 2021 05:52)  T(F): 97.6 (06 May 2021 05:52), Max: 97.6 (06 May 2021 05:52)  HR: 94 (06 May 2021 05:52) (94 - 94)  BP: 141/79 (06 May 2021 05:52) (141/79 - 141/79)  BP(mean): --  ABP: --  ABP(mean): --  RR: 15 (06 May 2021 05:52) (15 - 15)  SpO2: 98% (06 May 2021 05:52) (98% - 98%)      NEURO  Exam: GCS15, A&Ox4  aspirin Suppository 300 milliGRAM(s) Rectal once  HYDROmorphone  Injectable 0.5 milliGRAM(s) IV Push every 4 hours PRN Severe Pain (7 - 10)  HYDROmorphone  Injectable 0.25 milliGRAM(s) IV Push every 4 hours PRN Moderate Pain (4 - 6)      RESPIRATORY  Mechanical Ventilation:   ABG - ( 06 May 2021 15:10 )  pH: 7.44  /  pCO2: 37    /  pO2: 253   / HCO3: 26    / Base Excess: 1.3   /  SaO2: 99.5    Lactate: x          Exam: tracheostomy c/d/i, neck incision c/d/i, neck JONAS sanguinous      CARDIOVASCULAR    Exam: extremities IJA, sigifredo doppler with strong signal  Cardiac Rhythm: normal sinus      GI/NUTRITION  Exam: abdomen soft, nondistended, NGT in place  Diet: NPO      GENITOURINARY/RENAL  lactated ringers. 1000 milliLiter(s) IV Continuous <Continuous>      Weight (kg): 60.8 (05-06 @ 05:52)        [ ] Allen catheter, indication: urine output monitoring in critically ill patient    HEMATOLOGIC  [x] VTE Prophylaxis:  enoxaparin Injectable 40 milliGRAM(s) SubCutaneous daily        Transfusion: [ ] PRBC	[ ] Platelets	[ ] FFP	[ ] Cryoprecipitate      INFECTIOUS DISEASES  ampicillin/sulbactam  IVPB 3 Gram(s) IV Intermittent once  ampicillin/sulbactam  IVPB 1.5 Gram(s) IV Intermittent every 6 hours    RECENT CULTURES:      ENDOCRINE    CAPILLARY BLOOD GLUCOSE          PATIENT CARE ACCESS DEVICES:  [x] Peripheral IV  [ ] Central Venous Line	[ ] R	[ ] L	[ ] IJ	[ ] Fem	[ ] SC	Placed:   [x] Arterial Line		[x] R	[ ] L	[ ] Fem	[x] Rad	[ ] Ax	Placed: 5/6/21  [ ] PICC:					[ ] Mediport  [x] Urinary Catheter, Date Placed: 5/6/21  [x] Necessity of urinary, arterial, and venous catheters discussed    
CHIEF COMPLAINT: Patient is a 70y old  Female who presents with a chief complaint of glossectomy, mandibular resection, neck dissection, trach, recon plate, dental implant placement (11 May 2021 07:56)      HPI: 69y/o female scheduled for glossectomy left neck dissection tracheostomy, marginal reconstruction plate possible implant placement X3 on 5/6/2021 with Dr. Mosqueda at [facility] OR under [place].  Pt states, "has inflammation of left lower gum for several yrs, worsening over time, denies pain.   S/p biopsy 3/2021 positive for malignancy (06 May 2021 07:24)    Patient seen and examined at bedside. Endorses some swelling of her lower lip, not painful. S/p decannulation this morning.     Allergies    No Known Allergies    Intolerances        HOME MEDICATIONS: [x] Reviewed- none    MEDICATIONS  (STANDING):  acetaminophen    Suspension .. 650 milliGRAM(s) Oral every 6 hours  ampicillin/sulbactam  IVPB 1.5 Gram(s) IV Intermittent every 6 hours  aspirin  chewable 81 milliGRAM(s) Oral daily  BACItracin   Ointment 1 Application(s) Topical two times a day  chlorhexidine 0.12% Liquid 15 milliLiter(s) Oral Mucosa two times a day  enoxaparin Injectable 40 milliGRAM(s) SubCutaneous daily  senna 2 Tablet(s) Oral at bedtime    MEDICATIONS  (PRN):  ondansetron Injectable 4 milliGRAM(s) IV Push every 4 hours PRN Nausea and/or Vomiting  oxyCODONE    IR 5 milliGRAM(s) Oral every 6 hours PRN Moderate Pain (4 - 6)  oxyCODONE    IR 10 milliGRAM(s) Oral every 6 hours PRN Severe Pain (7 - 10)      PAST MEDICAL & SURGICAL HISTORY:  H/O varicose veins    Aortic insufficiency    Malignant neoplasm of gingiva    H/O varicose vein stripping  1989    [x] Reviewed     SOCIAL HISTORY:  [ ] Substance abuse: Denies  [ ] Alcohol use: Denies  [ ] Tobacco: Denies    Retired     FAMILY HISTORY:  FH: lung cancer (Father, Sibling)    REVIEW OF SYSTEMS:  [x] All other ROS negative  [  ] Unable to obtain due to poor mental status    Vital Signs Last 24 Hrs  T(C): 36.8 (11 May 2021 09:41), Max: 37.1 (11 May 2021 06:00)  T(F): 98.2 (11 May 2021 09:41), Max: 98.8 (11 May 2021 06:00)  HR: 85 (11 May 2021 09:41) (77 - 98)  BP: 123/59 (11 May 2021 09:41) (107/62 - 125/58)  BP(mean): --  RR: 18 (11 May 2021 09:41) (16 - 18)  SpO2: 99% (11 May 2021 09:41) (95% - 99%)    PHYSICAL EXAM:  GENERAL: NAD, well-groomed, well-developed  HEAD:  Atraumatic, Normocephalic  EYES: EOMI, PERRLA, conjunctiva and sclera clear  ENMT: Moist mucous membranes, minimal swelling of lower lip with dried blood  NECK: Trach decannulated, incision C/D/I  RESPIRATORY: Clear to auscultation bilaterally; No rales, rhonchi, wheezing, or rubs  CARDIOVASCULAR: Regular rate and rhythm; No murmurs, rubs, or gallops  GASTROINTESTINAL: Soft, Nontender, Nondistended; Bowel sounds present  EXTREMITIES: No clubbing, cyanosis, or edema  NERVOUS SYSTEM:  Alert & Oriented X3; Moving all 4 extremities; No gross sensory deficits  SKIN: No rashes or lesions; Incisions C/D/I    LABS:                        9.9    4.87  )-----------( 199      ( 11 May 2021 07:51 )             30.0     Hemoglobin: 9.9 g/dL (05-11 @ 07:51)  Hemoglobin: 8.5 g/dL (05-10 @ 06:29)  Hemoglobin: 8.3 g/dL (05-09 @ 02:29)  Hemoglobin: 8.6 g/dL (05-08 @ 02:38)  Hemoglobin: 9.6 g/dL (05-07 @ 01:40)  Hemoglobin: 10.5 g/dL (05-06 @ 20:39)    05-11    142  |  107  |  13  ----------------------------<  99  3.9   |  26  |  0.58    Ca    8.3<L>      11 May 2021 07:51  Phos  3.3     05-11  Mg     1.9     05-11      Microbiology     RADIOLOGY & ADDITIONAL STUDIES:    EKG:   Personally Reviewed:  [x] YES     Imaging:   Personally Reviewed:  [x] YES               [ ] Consultant(s) Notes Reviewed  [x] Care Discussed with Consultants/Other Providers: ENT

## 2021-05-11 NOTE — DISCHARGE NOTE PROVIDER - NSDCCPCAREPLAN_GEN_ALL_CORE_FT
PRINCIPAL DISCHARGE DIAGNOSIS  Diagnosis: Primary cancer of mandible  Assessment and Plan of Treatment:

## 2021-05-11 NOTE — CONSULT NOTE ADULT - ASSESSMENT
71yo F with L alveolar ridge SCC s/p L composite resection, marginal mandibulectomy, placement of implants by OMFS, L SND 1-3, L radial forearm free flap 5/6.

## 2021-05-11 NOTE — DISCHARGE NOTE PROVIDER - NSDCFUADDAPPT_GEN_ALL_CORE_FT
You have an appointment scheduled with oral surgery, Dr. Mosqueda in one week. The oral surgery resident gave you an appointment card with the Date, time, address and phone number.     Follow up with Dr. Hernández on Tuesday 5/18/21. Someone from his office will call you to schedule your appointment. If you do not receive a phone call by 5/14 then call the office and ask for Tatyana or Coco to schedule the appointment.   You have an appointment scheduled with oral surgery, Dr. Mosqueda in one week. The oral surgery resident gave you an appointment card with the Date, time, address and phone number.     Follow up with Dr. Hernández on Tuesday 5/18/21. Someone from his office will call you to schedule your appointment. If you do not receive a phone call by 5/14 then call the office and ask for Tatyana or Coco to schedule the appointment.      Follow up with Dr. Dill in 2 weeks, call office for appointment.    follow up with your PCP when available.  You have an appointment scheduled with oral surgery, Dr. Mosqueda in one week. The oral surgery resident gave you an appointment card with the Date, time, address and phone number.     Follow up with Dr. Erickson on Tuesday 5/18/21. Someone from his office will call you to schedule your appointment. If you do not receive a phone call by 5/14 then call the office and ask for Tatyana or Coco to schedule the appointment.      Follow up with Dr. Hernández in 2 weeks, call office and ask for Tatyana or Coco to schedule the appointment.     follow up with your PCP when available.  You have an appointment scheduled with oral surgery, Dr. Mosqueda in one week. The oral surgery resident gave you an appointment card with the Date, time, address and phone number.     Follow up with Dr. Dill on Tuesday 5/18/21. Someone from his office will call you to schedule your appointment. If you do not receive a phone call by 5/14 then call the office and ask for Tatyana or Coco to schedule the appointment.      Follow up with Dr. Hernández in 2 weeks, call office and ask for Tatyana or Coco to schedule the appointment.     follow up with your PCP when available.

## 2021-05-12 DIAGNOSIS — R13.12 DYSPHAGIA, OROPHARYNGEAL PHASE: ICD-10-CM

## 2021-05-12 LAB
ANION GAP SERPL CALC-SCNC: 9 MMOL/L — SIGNIFICANT CHANGE UP (ref 7–14)
BUN SERPL-MCNC: 14 MG/DL — SIGNIFICANT CHANGE UP (ref 7–23)
CALCIUM SERPL-MCNC: 8.2 MG/DL — LOW (ref 8.4–10.5)
CHLORIDE SERPL-SCNC: 105 MMOL/L — SIGNIFICANT CHANGE UP (ref 98–107)
CO2 SERPL-SCNC: 26 MMOL/L — SIGNIFICANT CHANGE UP (ref 22–31)
CREAT SERPL-MCNC: 0.59 MG/DL — SIGNIFICANT CHANGE UP (ref 0.5–1.3)
FERRITIN SERPL-MCNC: 113 NG/ML — SIGNIFICANT CHANGE UP (ref 15–150)
FOLATE SERPL-MCNC: 17.8 NG/ML — HIGH (ref 3.1–17.5)
GLUCOSE SERPL-MCNC: 108 MG/DL — HIGH (ref 70–99)
HCT VFR BLD CALC: 28.8 % — LOW (ref 34.5–45)
HGB BLD-MCNC: 9.4 G/DL — LOW (ref 11.5–15.5)
IRON SATN MFR SERPL: 59 UG/DL — SIGNIFICANT CHANGE UP (ref 30–160)
MAGNESIUM SERPL-MCNC: 2 MG/DL — SIGNIFICANT CHANGE UP (ref 1.6–2.6)
MCHC RBC-ENTMCNC: 31.8 PG — SIGNIFICANT CHANGE UP (ref 27–34)
MCHC RBC-ENTMCNC: 32.6 GM/DL — SIGNIFICANT CHANGE UP (ref 32–36)
MCV RBC AUTO: 97.3 FL — SIGNIFICANT CHANGE UP (ref 80–100)
NRBC # BLD: 0 /100 WBCS — SIGNIFICANT CHANGE UP
NRBC # FLD: 0 K/UL — SIGNIFICANT CHANGE UP
PHOSPHATE SERPL-MCNC: 3.1 MG/DL — SIGNIFICANT CHANGE UP (ref 2.5–4.5)
PLATELET # BLD AUTO: 211 K/UL — SIGNIFICANT CHANGE UP (ref 150–400)
POTASSIUM SERPL-MCNC: 3.9 MMOL/L — SIGNIFICANT CHANGE UP (ref 3.5–5.3)
POTASSIUM SERPL-SCNC: 3.9 MMOL/L — SIGNIFICANT CHANGE UP (ref 3.5–5.3)
RBC # BLD: 2.96 M/UL — LOW (ref 3.8–5.2)
RBC # FLD: 12 % — SIGNIFICANT CHANGE UP (ref 10.3–14.5)
SODIUM SERPL-SCNC: 140 MMOL/L — SIGNIFICANT CHANGE UP (ref 135–145)
VIT B12 SERPL-MCNC: 310 PG/ML — SIGNIFICANT CHANGE UP (ref 200–900)
WBC # BLD: 4.46 K/UL — SIGNIFICANT CHANGE UP (ref 3.8–10.5)
WBC # FLD AUTO: 4.46 K/UL — SIGNIFICANT CHANGE UP (ref 3.8–10.5)

## 2021-05-12 PROCEDURE — 74230 X-RAY XM SWLNG FUNCJ C+: CPT | Mod: 26

## 2021-05-12 PROCEDURE — 99232 SBSQ HOSP IP/OBS MODERATE 35: CPT

## 2021-05-12 RX ADMIN — Medication 650 MILLIGRAM(S): at 13:02

## 2021-05-12 RX ADMIN — Medication 650 MILLIGRAM(S): at 06:07

## 2021-05-12 RX ADMIN — Medication 650 MILLIGRAM(S): at 00:03

## 2021-05-12 RX ADMIN — Medication 1 APPLICATION(S): at 06:07

## 2021-05-12 RX ADMIN — Medication 81 MILLIGRAM(S): at 13:01

## 2021-05-12 RX ADMIN — AMPICILLIN SODIUM AND SULBACTAM SODIUM 100 GRAM(S): 250; 125 INJECTION, POWDER, FOR SUSPENSION INTRAMUSCULAR; INTRAVENOUS at 13:01

## 2021-05-12 RX ADMIN — Medication 650 MILLIGRAM(S): at 01:00

## 2021-05-12 RX ADMIN — Medication 650 MILLIGRAM(S): at 07:00

## 2021-05-12 RX ADMIN — Medication 650 MILLIGRAM(S): at 17:52

## 2021-05-12 RX ADMIN — Medication 650 MILLIGRAM(S): at 23:45

## 2021-05-12 RX ADMIN — AMPICILLIN SODIUM AND SULBACTAM SODIUM 100 GRAM(S): 250; 125 INJECTION, POWDER, FOR SUSPENSION INTRAMUSCULAR; INTRAVENOUS at 00:03

## 2021-05-12 RX ADMIN — CHLORHEXIDINE GLUCONATE 15 MILLILITER(S): 213 SOLUTION TOPICAL at 06:07

## 2021-05-12 RX ADMIN — CHLORHEXIDINE GLUCONATE 15 MILLILITER(S): 213 SOLUTION TOPICAL at 17:52

## 2021-05-12 RX ADMIN — Medication 650 MILLIGRAM(S): at 13:32

## 2021-05-12 RX ADMIN — Medication 650 MILLIGRAM(S): at 18:22

## 2021-05-12 RX ADMIN — ENOXAPARIN SODIUM 40 MILLIGRAM(S): 100 INJECTION SUBCUTANEOUS at 13:01

## 2021-05-12 RX ADMIN — AMPICILLIN SODIUM AND SULBACTAM SODIUM 100 GRAM(S): 250; 125 INJECTION, POWDER, FOR SUSPENSION INTRAMUSCULAR; INTRAVENOUS at 06:07

## 2021-05-12 RX ADMIN — Medication 1 APPLICATION(S): at 17:52

## 2021-05-12 NOTE — PROGRESS NOTE ADULT - SUBJECTIVE AND OBJECTIVE BOX
Interval history  The patient was seen and examined. No acute events overnight. Patient denies pain, resting comfortably.    Vital Signs Last 24 Hrs  T(C): 36.7 (12 May 2021 09:40), Max: 37.1 (12 May 2021 06:05)  T(F): 98 (12 May 2021 09:40), Max: 98.7 (12 May 2021 06:05)  HR: 90 (12 May 2021 09:40) (80 - 94)  BP: 112/65 (12 May 2021 09:40) (108/43 - 123/58)  BP(mean): --  RR: 18 (12 May 2021 09:40) (18 - 18)  SpO2: 98% (12 May 2021 09:40) (95% - 100%)OMFS Progress Note                          9.4    4.46  )-----------( 211      ( 12 May 2021 07:22 )             28.8     05-12    140  |  105  |  14  ----------------------------<  108<H>  3.9   |  26  |  0.59    Ca    8.2<L>      12 May 2021 07:22  Phos  3.1     05-12  Mg     2.0     05-12        MEDICATIONS  (STANDING):  acetaminophen    Suspension .. 650 milliGRAM(s) Oral every 6 hours  ampicillin/sulbactam  IVPB 1.5 Gram(s) IV Intermittent every 6 hours  aspirin  chewable 81 milliGRAM(s) Oral daily  BACItracin   Ointment 1 Application(s) Topical two times a day  chlorhexidine 0.12% Liquid 15 milliLiter(s) Oral Mucosa two times a day  enoxaparin Injectable 40 milliGRAM(s) SubCutaneous daily  senna 2 Tablet(s) Oral at bedtime    MEDICATIONS  (PRN):  ondansetron Injectable 4 milliGRAM(s) IV Push every 4 hours PRN Nausea and/or Vomiting  oxyCODONE    IR 5 milliGRAM(s) Oral every 6 hours PRN Moderate Pain (4 - 6)  oxyCODONE    IR 10 milliGRAM(s) Oral every 6 hours PRN Severe Pain (7 - 10)        PE    NAD  HEENT: Flap with good color, neck soft to palpation with expected postop edema. trach decannulated 4x4 atop  LUE: RFFF donor site hemostatic, flap well perfused and arm soft, motor and sensation grossly intact  Intraoral: surgical sites closed and hemostatic  Neuro: +L V3 paresthesia

## 2021-05-12 NOTE — PROGRESS NOTE ADULT - SUBJECTIVE AND OBJECTIVE BOX
Ashleigh Sykes  [facility] Medicine  Pager #[phone]    Patient is a 70y old  Female who presents with a chief complaint of glossectomy, mandibular resection, neck dissection, trach, recon plate, dental implant placement (12 May 2021 09:32)      SUBJECTIVE / OVERNIGHT EVENTS: Patient seen and examined at bedside. Patient went for MBS in AM.     ADDITIONAL REVIEW OF SYSTEMS:    MEDICATIONS  (STANDING):  acetaminophen    Suspension .. 650 milliGRAM(s) Oral every 6 hours  aspirin  chewable 81 milliGRAM(s) Oral daily  BACItracin   Ointment 1 Application(s) Topical two times a day  chlorhexidine 0.12% Liquid 15 milliLiter(s) Oral Mucosa two times a day  enoxaparin Injectable 40 milliGRAM(s) SubCutaneous daily  senna 2 Tablet(s) Oral at bedtime    MEDICATIONS  (PRN):  ondansetron Injectable 4 milliGRAM(s) IV Push every 4 hours PRN Nausea and/or Vomiting  oxyCODONE    IR 5 milliGRAM(s) Oral every 6 hours PRN Moderate Pain (4 - 6)  oxyCODONE    IR 10 milliGRAM(s) Oral every 6 hours PRN Severe Pain (7 - 10)  polyethylene glycol 3350 17 Gram(s) Oral daily PRN Constipation      CAPILLARY BLOOD GLUCOSE        I&O's Summary    11 May 2021 07:01  -  12 May 2021 07:00  --------------------------------------------------------  IN: 1076 mL / OUT: 135 mL / NET: 941 mL        PHYSICAL EXAM:    Vital Signs Last 24 Hrs  T(C): 36.7 (12 May 2021 09:40), Max: 37.1 (12 May 2021 06:05)  T(F): 98 (12 May 2021 09:40), Max: 98.7 (12 May 2021 06:05)  HR: 90 (12 May 2021 09:40) (80 - 94)  BP: 112/65 (12 May 2021 09:40) (108/43 - 123/58)  BP(mean): --  RR: 18 (12 May 2021 09:40) (18 - 18)  SpO2: 98% (12 May 2021 09:40) (95% - 100%)    PHYSICAL EXAM:  GENERAL: NAD, well-groomed, well-developed  EYES: EOMI, conjunctiva and sclera clear  ENMT: Moist mucous membranes, minimal swelling of lower lip with dried blood  NECK: Trach decannulated, incision C/D/I  RESPIRATORY: Clear to auscultation bilaterally; No rales, rhonchi, wheezing, or rubs  CARDIOVASCULAR: Regular rate and rhythm; No murmurs, rubs, or gallops  GASTROINTESTINAL: Soft, Nontender, Nondistended; Bowel sounds present  EXTREMITIES: No clubbing, cyanosis, or edema  NERVOUS SYSTEM: Alert & Oriented X3; Moving all 4 extremities; No gross sensory deficits  SKIN: Mild swelling of lower lip with dried blood, no vesicles noted    LABS:                        9.4    4.46  )-----------( 211      ( 12 May 2021 07:22 )             28.8     05-12    140  |  105  |  14  ----------------------------<  108<H>  3.9   |  26  |  0.59    Ca    8.2<L>      12 May 2021 07:22  Phos  3.1     05-12  Mg     2.0     05-12      RADIOLOGY & ADDITIONAL TESTS: No new imaging  Results Reviewed: Yes  Imaging Personally Reviewed:  Electrocardiogram Personally Reviewed:    COORDINATION OF CARE:  Care Discussed with Consultants/Other Providers [Y/N]:  Prior or Outpatient Records Reviewed [Y/N]:

## 2021-05-12 NOTE — SWALLOW VFSS/MBS ASSESSMENT ADULT - PHARYNGEAL PHASE COMMENTS
Dad notified at visit. There is adequate base of tongue retraction, adequate hyolaryngeal elevation and excursion, adequate pharyngeal contraction and adequate laryngeal vestibule closure.  There is adequate Pharyngeal Clearance.

## 2021-05-12 NOTE — SWALLOW VFSS/MBS ASSESSMENT ADULT - DIAGNOSTIC IMPRESSIONS
Patient presents with Mild Oral Dysphagia and Mild Pharyngeal Dysphagia.    Oral phase characterized by reduced oral aperture, adequate utensil stripping, trace loss when self-administering cup sips of thin liquids, adequate anterior bolus containment, slow mastication with regular solids with mastication primarily on the left side of the oral cavity.  There is reduced tongue to palate seal with mild-moderate premature spillage of nectar thick liquids and thin liquids to the hypopharynx, resulting in one instance of Laryngeal Penetration before the swallow with Nectar thick liquids with complete retrieval.  There is adequate tongue motion, adequate anterior to posterior transport, piecemeal transport resulting in 2 swallows per trial and adequate oral cavity clearance. Pharyngeal phase characterized by delayed pharyngeal swallow initiating at the vallecula with thin liquids.  There is adequate base of tongue retraction, adequate hyolaryngeal elevation and excursion, adequate pharyngeal contraction and adequate laryngeal vestibule closure.  There is adequate Pharyngeal Clearance.  There is No Laryngeal Penetration or Aspiration before, during or after the swallow with puree, mechanical soft solids, regular solids and honey thick liquids.  There is one Instance of Laryngeal Penetration before the swallow on premature spillage and inconsistent Laryngeal Penetration during the swallow with nectar thick liquids with complete retrieval maintaining adequate airway protection.  There is Laryngeal Penetration during the swallow with thin liquids with complete retrieval maintaining adequate airway protection.  Small Sips reduces the amount of Laryngeal Penetration with thin liquids.      Of note, patient with throat clear following trials of nectar thick liquids and solids with no barium visualized in the upper airway.

## 2021-05-12 NOTE — PROGRESS NOTE ADULT - SUBJECTIVE AND OBJECTIVE BOX
SUBJECTIVE:  Pt seen & examined at bedside  No acute events overnight  Pain controlled  No complaints  No F/C, N/V, CP/SOB    Vital Signs Last 24 Hrs  T(C): 36.6 (12 May 2021 18:17), Max: 37.1 (12 May 2021 06:05)  T(F): 97.8 (12 May 2021 18:17), Max: 98.7 (12 May 2021 06:05)  HR: 99 (12 May 2021 18:17) (80 - 99)  BP: 115/59 (12 May 2021 18:17) (108/43 - 123/58)  BP(mean): --  RR: 18 (12 May 2021 18:17) (18 - 18)  SpO2: 98% (12 May 2021 18:17) (95% - 99%)    General: NAD  Respiratory: No respiratory distress, stridor, or stertor  HEENT: neck soft/flat. staples in place. incision cdi. NGT in place. decannulated  OC: intraoral skin paddle normal turgor, color, warm. strong doppler signal  Ext: L arm incisions are clean, dry and intact       LABS:                         9.4    4.46  )-----------( 211      ( 12 May 2021 07:22 )             28.8     05-12    140  |  105  |  14  ----------------------------<  108<H>  3.9   |  26  |  0.59    Ca    8.2<L>      12 May 2021 07:22  Phos  3.1     05-12  Mg     2.0     05-12                    RADIOLOGY, EKG & ADDITIONAL TESTS: Reviewed.     70F s/p L composite resection alveolar ridge SCCa, marginal mandibulectomy, L SND 1-4, trach, L rad FF, L plate recon w/ implants w/ OMFS 5/6 s/p decannulation 5/11  - unasyn per OMFS  -MBS today  - lovenox/asa  - on bolus feeds  - f/u PT consult/OOBTC  - flap checks per PRS  - doppler per PRS  - home meds  - bowel regimen  - pain control

## 2021-05-12 NOTE — SWALLOW VFSS/MBS ASSESSMENT ADULT - ADDITIONAL RECOMMENDATIONS
1. Monitor tolerance and reconsult this service as indicated  2. Swallow Therapy at [facility] [phone]

## 2021-05-12 NOTE — PROGRESS NOTE ADULT - SUBJECTIVE AND OBJECTIVE BOX
Subjective:   Patient seen at bedside this AM. Reports feeling well, without complaints. Trach decannulated yesterday. Having some volar/ulnar wrist pain.     Objective:  Vital Signs  T(C): 37.1 (05-12 @ 06:05), Max: 37.1 (05-12 @ 06:05)  HR: 80 (05-12 @ 06:05) (80 - 94)  BP: 123/58 (05-12 @ 06:05) (108/43 - 123/59)  RR: 18 (05-12 @ 06:05) (18 - 18)  SpO2: 98% (05-12 @ 06:05) (95% - 100%)  05-11-21 @ 07:01  -  05-12-21 @ 07:00  --------------------------------------------------------  IN:  Total IN: 0 mL    OUT:    Bulb (mL): 35 mL    Voided (mL): 100 mL  Total OUT: 135 mL    Total NET: -135 mL        PHYSICAL EXAM    NAD  HEENT: Flap with good color and cap refill, Cook doppler with strong signal, neck soft to palpation with improving postop edema, intraoral flap pink and viable w/o dehiscence  LUE: RFFF donor site with mild dried blood as expected, flap well perfused and arm soft, motor and sensation grossly intact.  Drains: SS output as expected    Labs:                        9.9    4.87  )-----------( 199      ( 11 May 2021 07:51 )             30.0   05-11    142  |  107  |  13  ----------------------------<  99  3.9   |  26  |  0.58    Ca    8.3<L>      11 May 2021 07:51  Phos  3.3     05-11  Mg     1.9     05-11      CAPILLARY BLOOD GLUCOSE          Medications:   MEDICATIONS  (STANDING):  acetaminophen    Suspension .. 650 milliGRAM(s) Oral every 6 hours  ampicillin/sulbactam  IVPB 1.5 Gram(s) IV Intermittent every 6 hours  aspirin  chewable 81 milliGRAM(s) Oral daily  BACItracin   Ointment 1 Application(s) Topical two times a day  chlorhexidine 0.12% Liquid 15 milliLiter(s) Oral Mucosa two times a day  enoxaparin Injectable 40 milliGRAM(s) SubCutaneous daily  senna 2 Tablet(s) Oral at bedtime    MEDICATIONS  (PRN):  ondansetron Injectable 4 milliGRAM(s) IV Push every 4 hours PRN Nausea and/or Vomiting  oxyCODONE    IR 5 milliGRAM(s) Oral every 6 hours PRN Moderate Pain (4 - 6)  oxyCODONE    IR 10 milliGRAM(s) Oral every 6 hours PRN Severe Pain (7 - 10)  polyethylene glycol 3350 17 Gram(s) Oral daily PRN Constipation        Assessment  71yo F with L alveolar ridge scca s/p L composite resection, marginal mandibulectomy, placement of implants by OMFS, L SND 1-3, L radial forearm free flap 5/6, recovering appropriately.     Plan  - lovenox/asa  - JONAS left arm to bulb suction, monitor output  - flap checks q4 today  - doppler q4  - plastic surgery will continue to follow        Annamarie Grissom, PGY-1  Plastic and Reconstructive Surgery  [id number]

## 2021-05-12 NOTE — SWALLOW VFSS/MBS ASSESSMENT ADULT - ORAL PHASE
Incomplete tongue to palate contact/Uncontrolled bolus / spillover in hypopharynx within functional limits

## 2021-05-12 NOTE — SWALLOW VFSS/MBS ASSESSMENT ADULT - COMMENTS
As per ENT Note: 70F s/p L composite resection alveolar ridge SCCa, marginal mandibulectomy, L SND 1-4, trach, L rad FF, L plate recon w/ implants w/ OMFS 5/6    As per Plastic surgery Note: 69yo F with L alveolar ridge scca s/p L composite resection, marginal mandibulectomy, placement of implants by OMFS, L SND 1-3, L radial forearm free flap 5/6, recovering appropriately.    As per Operative Note: L marginal mandibulectomy, oral cavity resection, L SND 1-3, L radial forearm free flap    Charting indicates patient was decannulated on 5/11/21.     SLP received patient sitting upright in holsted chair, awake, alert, able to answer questions, follow directions and engage in conversation.  Patient denies pain. Noted bandage over stoma site, surgical staples across anterior neck and edema to anterior neck.  Voice appears WFL.

## 2021-05-12 NOTE — PROGRESS NOTE ADULT - ASSESSMENT
71yo F with L alveolar ridge scca s/p L composite resection, marginal mandibulectomy, placement of implants by OMFS, L SND 1-3, L radial forearm free flap 5/6, recovering appropriately.     Plan    - Post-op panorex x-ray taken confirming appropriate recon plate and implant placement  - Appreciate f/u with ENT and plastics  - Flap care as per PRS  - OMFS to follow    [id number] OMFS

## 2021-05-13 ENCOUNTER — TRANSCRIPTION ENCOUNTER (OUTPATIENT)
Age: 71
End: 2021-05-13

## 2021-05-13 VITALS — WEIGHT: 134.04 LBS

## 2021-05-13 LAB — SURGICAL PATHOLOGY STUDY: SIGNIFICANT CHANGE UP

## 2021-05-13 PROCEDURE — 99232 SBSQ HOSP IP/OBS MODERATE 35: CPT

## 2021-05-13 RX ORDER — BACITRACIN ZINC 500 UNIT/G
1 OINTMENT IN PACKET (EA) TOPICAL
Qty: 1 | Refills: 0
Start: 2021-05-13 | End: 2021-05-16

## 2021-05-13 RX ORDER — ASPIRIN/CALCIUM CARB/MAGNESIUM 324 MG
1 TABLET ORAL
Qty: 30 | Refills: 3
Start: 2021-05-13 | End: 2021-09-09

## 2021-05-13 RX ORDER — OXYCODONE HYDROCHLORIDE 5 MG/1
1 TABLET ORAL
Qty: 16 | Refills: 0
Start: 2021-05-13 | End: 2021-05-16

## 2021-05-13 RX ORDER — ASPIRIN/CALCIUM CARB/MAGNESIUM 324 MG
1 TABLET ORAL
Qty: 0 | Refills: 0 | DISCHARGE
Start: 2021-05-13

## 2021-05-13 RX ADMIN — Medication 650 MILLIGRAM(S): at 00:30

## 2021-05-13 RX ADMIN — Medication 1 APPLICATION(S): at 05:34

## 2021-05-13 RX ADMIN — Medication 650 MILLIGRAM(S): at 05:30

## 2021-05-13 RX ADMIN — Medication 81 MILLIGRAM(S): at 13:19

## 2021-05-13 RX ADMIN — CHLORHEXIDINE GLUCONATE 15 MILLILITER(S): 213 SOLUTION TOPICAL at 05:30

## 2021-05-13 RX ADMIN — Medication 650 MILLIGRAM(S): at 06:15

## 2021-05-13 NOTE — DISCHARGE NOTE NURSING/CASE MANAGEMENT/SOCIAL WORK - NSDPDISTO_GEN_ALL_CORE
Home Patient on metformin and Glimepiride at home. HgA1C of 6.0.   - ISS+ metformin 1000mg BID  - 22U Lantus QHS, dose daily based on requirements   - monitor FSG

## 2021-05-13 NOTE — DISCHARGE NOTE NURSING/CASE MANAGEMENT/SOCIAL WORK - PATIENT PORTAL LINK FT
You can access the FollowMyHealth Patient Portal offered by [facility] by registering at the following website: [email or web address]. By joining [employer]’s FollowMyHealth portal, you will also be able to view your health information using other applications (apps) compatible with our system.

## 2021-05-13 NOTE — PROGRESS NOTE ADULT - SUBJECTIVE AND OBJECTIVE BOX
Subjective:   Patient seen at bedside this AM. Reports feeling well, without complaints. Eager to go home.    Objective:  Vital Signs  T(C): 36.7 (05-13 @ 05:31), Max: 37 (05-12 @ 15:18)  HR: 76 (05-13 @ 05:31) (76 - 99)  BP: 124/66 (05-13 @ 05:31) (107/61 - 124/66)  RR: 17 (05-13 @ 05:31) (17 - 18)  SpO2: 100% (05-13 @ 05:31) (98% - 100%)  05-12-21 @ 07:01  -  05-13-21 @ 07:00  --------------------------------------------------------  IN:  Total IN: 0 mL    OUT:    Bulb (mL): 25 mL    Voided (mL): 800 mL  Total OUT: 825 mL    Total NET: -825 mL      PHYSICAL EXAM    NAD  HEENT: Flap with good color and cap refill, Cook doppler removed, neck soft to palpation with improving postop edema, intraoral flap pink and viable w/o dehiscence, incision c/d/i  LUE: RFFF donor site well perfused and arm soft, motor and sensation grossly intact, JONAS SS      Labs:                        9.4    4.46  )-----------( 211      ( 12 May 2021 07:22 )             28.8   05-12    140  |  105  |  14  ----------------------------<  108<H>  3.9   |  26  |  0.59    Ca    8.2<L>      12 May 2021 07:22  Phos  3.1     05-12  Mg     2.0     05-12      CAPILLARY BLOOD GLUCOSE          Medications:   MEDICATIONS  (STANDING):  aspirin  chewable 81 milliGRAM(s) Oral daily  BACItracin   Ointment 1 Application(s) Topical two times a day  chlorhexidine 0.12% Liquid 15 milliLiter(s) Oral Mucosa two times a day  enoxaparin Injectable 40 milliGRAM(s) SubCutaneous daily  senna 2 Tablet(s) Oral at bedtime    MEDICATIONS  (PRN):  ondansetron Injectable 4 milliGRAM(s) IV Push every 4 hours PRN Nausea and/or Vomiting  oxyCODONE    IR 5 milliGRAM(s) Oral every 6 hours PRN Moderate Pain (4 - 6)  oxyCODONE    IR 10 milliGRAM(s) Oral every 6 hours PRN Severe Pain (7 - 10)  polyethylene glycol 3350 17 Gram(s) Oral daily PRN Constipation        Assessment  71yo F with L alveolar ridge scca s/p L composite resection, marginal mandibulectomy, placement of implants by OMFS, L SND 1-3, L radial forearm free flap 5/6, recovering appropriately.     Plan  - lovenox/asa  - JONAS left arm to bulb suction, monitor output  - neck and arm incisions open to air  - plastic surgery stable for discharge        Annamarie Grissom, PGY-1  Plastic and Reconstructive Surgery  [id number] Subjective:   Patient seen at bedside this AM. Reports feeling well, without complaints. Eager to go home.    Objective:  Vital Signs  T(C): 36.7 (05-13 @ 05:31), Max: 37 (05-12 @ 15:18)  HR: 76 (05-13 @ 05:31) (76 - 99)  BP: 124/66 (05-13 @ 05:31) (107/61 - 124/66)  RR: 17 (05-13 @ 05:31) (17 - 18)  SpO2: 100% (05-13 @ 05:31) (98% - 100%)  05-12-21 @ 07:01  -  05-13-21 @ 07:00  --------------------------------------------------------  IN:  Total IN: 0 mL    OUT:    Bulb (mL): 25 mL    Voided (mL): 800 mL  Total OUT: 825 mL    Total NET: -825 mL      PHYSICAL EXAM    NAD  HEENT: Flap with good color and cap refill, Cook doppler removed, neck soft to palpation with improving postop edema, intraoral flap pink and viable w/o dehiscence, incision c/d/i  LUE: RFFF donor site well perfused and arm soft, motor and sensation grossly intact, JONAS SS      Labs:                        9.4    4.46  )-----------( 211      ( 12 May 2021 07:22 )             28.8   05-12    140  |  105  |  14  ----------------------------<  108<H>  3.9   |  26  |  0.59    Ca    8.2<L>      12 May 2021 07:22  Phos  3.1     05-12  Mg     2.0     05-12      CAPILLARY BLOOD GLUCOSE          Medications:   MEDICATIONS  (STANDING):  aspirin  chewable 81 milliGRAM(s) Oral daily  BACItracin   Ointment 1 Application(s) Topical two times a day  chlorhexidine 0.12% Liquid 15 milliLiter(s) Oral Mucosa two times a day  enoxaparin Injectable 40 milliGRAM(s) SubCutaneous daily  senna 2 Tablet(s) Oral at bedtime    MEDICATIONS  (PRN):  ondansetron Injectable 4 milliGRAM(s) IV Push every 4 hours PRN Nausea and/or Vomiting  oxyCODONE    IR 5 milliGRAM(s) Oral every 6 hours PRN Moderate Pain (4 - 6)  oxyCODONE    IR 10 milliGRAM(s) Oral every 6 hours PRN Severe Pain (7 - 10)  polyethylene glycol 3350 17 Gram(s) Oral daily PRN Constipation        Assessment  71yo F with L alveolar ridge scca s/p L composite resection, marginal mandibulectomy, placement of implants by OMFS, L SND 1-3, L radial forearm free flap 5/6, recovering appropriately.     Plan  - lovenox/asa  - JONAS left arm to bulb suction, monitor output  - neck and arm incisions open to air, bacitracin to L forearm  - plastic surgery stable for discharge        Annamarie Grissom, PGY-1  Plastic and Reconstructive Surgery  [phone]

## 2021-05-13 NOTE — PROGRESS NOTE ADULT - PROVIDER SPECIALTY LIST ADULT
ENT
ENT
OMFS
Plastic Surgery
ENT
Plastic Surgery
ENT
SICU
ENT
OMFS
Plastic Surgery
Plastic Surgery
SICU
OMFS
SICU
SICU
Hospitalist
OMFS
Hospitalist

## 2021-05-13 NOTE — PROGRESS NOTE ADULT - SUBJECTIVE AND OBJECTIVE BOX
Ashleigh Sykes  [facility] Medicine  Pager #[phone]    Patient is a 70y old  Female who presents with a chief complaint of glossectomy, mandibular resection, neck dissection, trach, recon plate, dental implant placement (13 May 2021 07:10)      SUBJECTIVE / OVERNIGHT EVENTS: Patient seen and examined at bedside. Passed swallow eval yesterday, tolerating soft diet with thin liquids. Staples removed this morning and JONAS drain pulled.     ADDITIONAL REVIEW OF SYSTEMS:    MEDICATIONS  (STANDING):  aspirin  chewable 81 milliGRAM(s) Oral daily  BACItracin   Ointment 1 Application(s) Topical two times a day  chlorhexidine 0.12% Liquid 15 milliLiter(s) Oral Mucosa two times a day  enoxaparin Injectable 40 milliGRAM(s) SubCutaneous daily  senna 2 Tablet(s) Oral at bedtime    MEDICATIONS  (PRN):  ondansetron Injectable 4 milliGRAM(s) IV Push every 4 hours PRN Nausea and/or Vomiting  oxyCODONE    IR 5 milliGRAM(s) Oral every 6 hours PRN Moderate Pain (4 - 6)  oxyCODONE    IR 10 milliGRAM(s) Oral every 6 hours PRN Severe Pain (7 - 10)  polyethylene glycol 3350 17 Gram(s) Oral daily PRN Constipation      CAPILLARY BLOOD GLUCOSE        I&O's Summary    12 May 2021 07:01  -  13 May 2021 07:00  --------------------------------------------------------  IN: 320 mL / OUT: 825 mL / NET: -505 mL        PHYSICAL EXAM:    Vital Signs Last 24 Hrs  T(C): 36.6 (13 May 2021 09:55), Max: 37 (12 May 2021 15:18)  T(F): 97.8 (13 May 2021 09:55), Max: 98.6 (12 May 2021 15:18)  HR: 91 (13 May 2021 09:55) (76 - 99)  BP: 110/66 (13 May 2021 09:55) (107/61 - 124/66)  BP(mean): --  RR: 18 (13 May 2021 09:55) (17 - 18)  SpO2: 100% (13 May 2021 09:55) (98% - 100%)    PHYSICAL EXAM:  GENERAL: NAD, well-groomed, well-developed  EYES: EOMI, conjunctiva and sclera clear  ENMT: Moist mucous membranes  NECK: Trach decannulated, incision C/D/I  RESPIRATORY: Clear to auscultation bilaterally; No rales, rhonchi, wheezing, or rubs  CARDIOVASCULAR: Regular rate and rhythm; No murmurs, rubs, or gallops  GASTROINTESTINAL: Soft, Nontender, Nondistended; Bowel sounds present  EXTREMITIES: No clubbing, cyanosis, or edema  NERVOUS SYSTEM: Alert & Oriented X3; Moving all 4 extremities; No gross sensory deficits  SKIN: Mild swelling of lower lip with dried blood, no vesicles noted, donor site C/D/I    LABS:                        9.4    4.46  )-----------( 211      ( 12 May 2021 07:22 )             28.8     05-12    140  |  105  |  14  ----------------------------<  108<H>  3.9   |  26  |  0.59    Ca    8.2<L>      12 May 2021 07:22  Phos  3.1     05-12  Mg     2.0     05-12      RADIOLOGY & ADDITIONAL TESTS:     < from: Xray Cinesophagram Swallow Function w/ Contrast (05.12.21 @ 11:52) >  FINDINGS/  IMPRESSION:    There is evidence of penetration with the tested consistencies.    For further information and recommendations, please refer to the speech pathologist final report which is available for review in the electronic medical record.    Results Reviewed: Yes  Imaging Personally Reviewed:  Electrocardiogram Personally Reviewed:    COORDINATION OF CARE:  Care Discussed with Consultants/Other Providers [Y/N]:  Prior or Outpatient Records Reviewed [Y/N]:

## 2021-05-13 NOTE — PROGRESS NOTE ADULT - PROBLEM SELECTOR PLAN 1
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
S/p L composite resection, marginal mandibulectomy, placement of implants by OMFS, tracheostomy, L SND 1-3, L radial forearm free flap 5/6  -Biopsy on 4/2 consistent with invasive SCC  -S/p trach decannulation on 5/11   -MBS done on 5/12, mechanical soft with thin liquids  -Wound care per ENT and plastics  -Pain control per primary team  -Bowel regimen  -Post-op x-rays with dental implants in place

## 2021-05-13 NOTE — DIETITIAN INITIAL EVALUATION ADULT. - ORAL INTAKE PTA/DIET HISTORY
Prior to admit Pt reports excellent PO intakes, consuming regular textured diet without any difficulties. No significant weight change within the past 3 months.

## 2021-05-13 NOTE — PROGRESS NOTE ADULT - PROBLEM SELECTOR PLAN 2
Normocytic anemia, likely combination of anemia of chronic disease in setting of neoplasm and post-op blood loss  -H/H stable, continue to monitor  -Iron studies, ferritin, Vitamin B12, folate levels WNL  -No signs of bleeding
Normocytic anemia, likely combination of anemia of chronic disease in setting of neoplasm and post-op blood loss  -H/H stable, continue to monitor  -Iron studies, ferritin, Vitamin B12, folate levels WNL  -No signs of bleeding

## 2021-05-13 NOTE — DIETITIAN INITIAL EVALUATION ADULT. - OTHER INFO
Per chart----Pt is 70F s/p L composite resection alveolar ridge SCCa, marginal mandibulectomy, L SND 1-4, trach, L rad FF, L plate recon w/ implants w/ OMFS 5/6 s/p decannulation 5/11.    Post surgery Pt was on NG feedings which were discontinued 5/12. Started PO diet on 5/12, Mech/Soft with thin liquids per SLP eval/recommendations. Pt reports some difficulty swallowing eggs and farina which was described as "too grainy". Denies nausea, vomiting and constipation. Pt for possible d/c, is looking forward to going home and eating favorite foods. Discussed mech/soft foods which will be best tolerated and offered strategies to incorporate nutrient meals, beverages, supplements as needed. Pt does not eat much meat, enjoys tofu. Educated on importance of adequate protein rich food intakes and provided alternatives. Pt with good understanding and asked many appropriate question regarding diet and overall nutritional status. Per chart----Pt is 70F s/p L composite resection alveolar ridge SCCa, marginal mandibulectomy, L SND 1-4, trach, L rad FF, L plate recon w/ implants w/ OMFS 5/6 s/p decannulation 5/11.    Post surgery Pt was on NG feedings which were discontinued 5/12. Started PO diet on 5/12, Mech/Soft with thin liquids per SLP eval/recommendations. Pt reports some difficulty swallowing eggs and farina which was described as "too grainy". Denies nausea, vomiting and constipation. Pt for possible d/c, is looking forward to going home and eating favorite foods. Discussed mech/soft foods which will be best tolerated and offered strategies to incorporate nutrient dense meals, beverages, intakes of PO supplements as needed. Pt does not eat much meat, enjoys tofu. Educated on importance of adequate protein rich food intakes and provided with alternatives. Pt with good understanding and asked many appropriate questions regarding diet and overall nutritional status.

## 2021-05-13 NOTE — PROGRESS NOTE ADULT - NSICDXPILOT_GEN_ALL_CORE
[place]

## 2021-05-13 NOTE — PROGRESS NOTE ADULT - REASON FOR ADMISSION
glossectomy, mandibular resection, neck dissection, trach, recon plate, dental implant placement

## 2021-05-13 NOTE — DIETITIAN INITIAL EVALUATION ADULT. - PERTINENT MEDS FT
MEDICATIONS  (STANDING):  aspirin  chewable 81 milliGRAM(s) Oral daily  BACItracin   Ointment 1 Application(s) Topical two times a day  chlorhexidine 0.12% Liquid 15 milliLiter(s) Oral Mucosa two times a day  enoxaparin Injectable 40 milliGRAM(s) SubCutaneous daily  senna 2 Tablet(s) Oral at bedtime

## 2021-05-13 NOTE — DISCHARGE NOTE NURSING/CASE MANAGEMENT/SOCIAL WORK - NSDCFUADDAPPT_GEN_ALL_CORE_FT
You have an appointment scheduled with oral surgery, Dr. Mosqueda in one week. The oral surgery resident gave you an appointment card with the Date, time, address and phone number.     Follow up with Dr. Dill on Tuesday 5/18/21. Someone from his office will call you to schedule your appointment. If you do not receive a phone call by 5/14 then call the office and ask for Tatyana or Coco to schedule the appointment.      Follow up with Dr. Hernández in 2 weeks, call office and ask for Tatyana or Coco to schedule the appointment.     follow up with your PCP when available.

## 2021-05-13 NOTE — PROGRESS NOTE ADULT - SUBJECTIVE AND OBJECTIVE BOX
SUBJECTIVE:  Pt seen & examined at bedside  No acute events overnight  Pain controlled  No complaints  No F/C, N/V, CP/SOB    Vital Signs Last 24 Hrs  T(C): 36.6 (12 May 2021 18:17), Max: 37.1 (12 May 2021 06:05)  T(F): 97.8 (12 May 2021 18:17), Max: 98.7 (12 May 2021 06:05)  HR: 99 (12 May 2021 18:17) (80 - 99)  BP: 115/59 (12 May 2021 18:17) (108/43 - 123/58)  BP(mean): --  RR: 18 (12 May 2021 18:17) (18 - 18)  SpO2: 98% (12 May 2021 18:17) (95% - 99%)    General: NAD  Respiratory: No respiratory distress, stridor, or stertor  HEENT: neck soft/flat. staples in place. incision cdi. NGT in place. decannulated  OC: intraoral skin paddle normal turgor, color, warm. strong doppler signal  Ext: L arm incisions are clean, dry and intact                     RADIOLOGY, EKG & ADDITIONAL TESTS: Reviewed.     70F s/p L composite resection alveolar ridge SCCa, marginal mandibulectomy, L SND 1-4, trach, L rad FF, L plate recon w/ implants w/ OMFS 5/6 s/p decannulation 5/11 passed MBS tolerating mech soft diet  - home today  - d/c unasyn per OMFS  - passed MBS on mech soft  - lovenox/asa  - ambulating  - staples and JONAS per PRS  - doppler per PRS  - home meds  - bowel regimen  - pain control

## 2021-05-18 ENCOUNTER — APPOINTMENT (OUTPATIENT)
Age: 71
End: 2021-05-18
Payer: MEDICARE

## 2021-05-18 ENCOUNTER — APPOINTMENT (OUTPATIENT)
Dept: OTOLARYNGOLOGY | Facility: CLINIC | Age: 71
End: 2021-05-18
Payer: MEDICARE

## 2021-05-18 VITALS
SYSTOLIC BLOOD PRESSURE: 125 MMHG | HEART RATE: 84 BPM | DIASTOLIC BLOOD PRESSURE: 87 MMHG | WEIGHT: 125 LBS | BODY MASS INDEX: 21.34 KG/M2 | HEIGHT: 64 IN

## 2021-05-18 PROCEDURE — 99024 POSTOP FOLLOW-UP VISIT: CPT

## 2021-05-18 RX ORDER — CHLORHEXIDINE GLUCONATE, 0.12% ORAL RINSE 1.2 MG/ML
0.12 SOLUTION DENTAL
Qty: 473 | Refills: 0 | Status: COMPLETED | COMMUNITY
Start: 2021-03-13

## 2021-05-18 RX ORDER — OXYCODONE 5 MG/1
5 TABLET ORAL
Qty: 16 | Refills: 0 | Status: COMPLETED | COMMUNITY
Start: 2021-05-13

## 2021-05-18 NOTE — REASON FOR VISIT
[Subsequent Evaluation] : a subsequent evaluation for [FreeTextEntry2] : s/p Reconstruction of oral cavity and restoration of oral vestibule using left radial forearm free flap, repair of forearm donor site with VY advancement flap measuring approximately 120 cm2, and tracheostomy, 05/06/2021.

## 2021-05-18 NOTE — HISTORY OF PRESENT ILLNESS
[de-identified] : 70 year old female with left buccal SCC, s/p Reconstruction of oral cavity and restoration of oral vestibule using left radial forearm free flap, repair of forearm donor site with VY advancement flap measuring approximately 120 cm2, and tracheostomy, 05/06/2021. Reports soreness and numbness in left forearm, denies bleeding. Reports headaches at night and waking up in the morning, takes tylenol with relief. States tongue still feels numb. States eating well. Denies dysphagia, odynophagia, dyspnea. Denies fevers, infections.

## 2021-05-18 NOTE — PHYSICAL EXAM
[Normal] : no rashes [de-identified] : Incision healing well.  Trach site healing well.  No LAD. [FreeTextEntry1] : Flap is healing well.  No concerning lesions in the OC/OPx on inspection or palpation.\par  [de-identified] : L. arm healing well.

## 2021-05-25 ENCOUNTER — APPOINTMENT (OUTPATIENT)
Dept: OTOLARYNGOLOGY | Facility: CLINIC | Age: 71
End: 2021-05-25
Payer: MEDICARE

## 2021-05-25 PROCEDURE — 99024 POSTOP FOLLOW-UP VISIT: CPT

## 2021-05-25 NOTE — CONSULT LETTER
[Dear  ___] : Dear  [unfilled], [Courtesy Letter:] : I had the pleasure of seeing your patient, [unfilled], in my office today. [Please see my note below.] : Please see my note below. [Sincerely,] : Sincerely, [FreeTextEntry2] : Nighat Ramirez DDS ([place], [place]) [FreeTextEntry3] : Yonis Hernández MD, FACS\par \par    [facility] [place]\par Associate Chair\par    Department of Otolaryngology\par \par Professor\par Otolaryngology & Molecular Medicine\par [employer]\par

## 2021-05-25 NOTE — HISTORY OF PRESENT ILLNESS
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 pt taking Tylenol at night for discomfort with relief. Pt on soft diet. Pt seen by Dr. Mosqueda yesterday, next appt in one month. Pt followed up with  5/18 next appt in Aug.

## 2021-06-07 ENCOUNTER — RESULT REVIEW (OUTPATIENT)
Age: 71
End: 2021-06-07

## 2021-06-07 ENCOUNTER — APPOINTMENT (OUTPATIENT)
Dept: PHYSICAL MEDICINE AND REHAB | Facility: CLINIC | Age: 71
End: 2021-06-07
Payer: MEDICARE

## 2021-06-07 ENCOUNTER — APPOINTMENT (OUTPATIENT)
Dept: RADIOLOGY | Facility: CLINIC | Age: 71
End: 2021-06-07
Payer: MEDICARE

## 2021-06-07 ENCOUNTER — APPOINTMENT (OUTPATIENT)
Dept: PHYSICAL MEDICINE AND REHAB | Facility: CLINIC | Age: 71
End: 2021-06-07

## 2021-06-07 ENCOUNTER — OUTPATIENT (OUTPATIENT)
Dept: OUTPATIENT SERVICES | Facility: HOSPITAL | Age: 71
LOS: 1 days | End: 2021-06-07
Payer: MEDICARE

## 2021-06-07 DIAGNOSIS — Z98.890 OTHER SPECIFIED POSTPROCEDURAL STATES: Chronic | ICD-10-CM

## 2021-06-07 DIAGNOSIS — M25.612 STIFFNESS OF LEFT SHOULDER, NOT ELSEWHERE CLASSIFIED: ICD-10-CM

## 2021-06-07 DIAGNOSIS — C76.0 MALIGNANT NEOPLASM OF HEAD, FACE AND NECK: ICD-10-CM

## 2021-06-07 PROCEDURE — 99204 OFFICE O/P NEW MOD 45 MIN: CPT

## 2021-06-07 PROCEDURE — 73030 X-RAY EXAM OF SHOULDER: CPT

## 2021-06-07 PROCEDURE — 73030 X-RAY EXAM OF SHOULDER: CPT | Mod: 26,LT

## 2021-06-07 NOTE — ASSESSMENT
[FreeTextEntry1] : 62-year-old female presenting for evaluation.\par \par #Left shoulder decreased range of motion\par -Likely secondary to scapular winging.\par -Obtain left shoulder x-ray to further evaluate.\par -Start physical therapy for scapular stabilization techniques as well as shoulder range of motion and stretching.\par -ENT, speech therapy notes reviewed.  Pathology report reports reviewed.\par \par #Decreased cervical range of motion\par -Likely secondary to postsurgical adhesions\par -Start physical therapy for myofascial release and range of motion\par \par #At risk for lymphedema\par -Discussed lymphedema as well as risks and potential treatments.  Given patient had cervical neck dissection she is at risk.  She does have some edema on exam today however she is still very close to her postoperative date and reports some improvement in the edema.  Patient structure to call if no improvement in 2 weeks will initiate complete decongestive therapy.\par \par Follow-up in 1 month.

## 2021-06-07 NOTE — HISTORY OF PRESENT ILLNESS
[FreeTextEntry1] : Ms. Estrella is a 70 year old female with left buccal SCC, s/p Reconstruction of oral cavity and restoration of oral vestibule using left radial forearm free flap, repair of forearm donor site with V_Y advancement flap measuring approximately 120 cm2, and tracheostomy, 05/06/2021. \par \par She is presenting today with decreased left shoulder range of motion as well as stiffness and tightness.  She denies any radiation pain down her arm.  She also reports some neck stiffness and tightness as well.  She has noticed some swelling in her left cervical region which she reports has been improving but is still persistent.\par

## 2021-06-07 NOTE — DATA REVIEWED
[FreeTextEntry1] : Pathology May 2021: 28 lymph nodes negative for metastatic carcinoma left levels 2 and 3 neck dissection.  3 lymph nodes negative for metastatic carcinoma level 1A neck dissection.

## 2021-06-07 NOTE — PHYSICAL EXAM
[FreeTextEntry1] : Gen: Patient is A&O x 3, NAD\par HEENT: EOMI, hearing grossly normal\par Resp: regular, non - labored\par CV: pulses regular\par Skin: no rashes, erythema\par Lymph: +left submental edema \par Inspection: left lateral scapula winging \par ROM: left shoulder restricted in active abduction to ~150 degrees\par Palpation:no tenderness to palpation\par Sensation: intact to light touch\par Reflexes: 1+ and symmetric throughout\par Strength: 5/5 throughout\par Special tests: -Capps sign, -Spurling sign \par Gait: normal, non-antalgic\par \par

## 2021-07-13 ENCOUNTER — APPOINTMENT (OUTPATIENT)
Dept: OTOLARYNGOLOGY | Facility: CLINIC | Age: 71
End: 2021-07-13
Payer: MEDICARE

## 2021-07-13 VITALS
DIASTOLIC BLOOD PRESSURE: 68 MMHG | HEART RATE: 86 BPM | SYSTOLIC BLOOD PRESSURE: 138 MMHG | RESPIRATION RATE: 17 BRPM | TEMPERATURE: 98 F | OXYGEN SATURATION: 98 %

## 2021-07-13 PROCEDURE — 99024 POSTOP FOLLOW-UP VISIT: CPT

## 2021-07-13 NOTE — CONSULT LETTER
[Dear  ___] : Dear  [unfilled], [Courtesy Letter:] : I had the pleasure of seeing your patient, [unfilled], in my office today. [Please see my note below.] : Please see my note below. [Sincerely,] : Sincerely, [FreeTextEntry2] : Nighat Ramirez DDS ([place], [place]) [FreeTextEntry3] : Yonis Hernández MD, FACS\par \par    [facility] [place]\par Associate Chair\par    Department of Otolaryngology\par \par Professor\par Otolaryngology & Molecular Medicine\par [facility] [employer]\par

## 2021-07-13 NOTE — HISTORY OF PRESENT ILLNESS
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  Pt seen by Dr. Mosqueda 06/22/2021, next appt 8/3. Pt followed up with  5/18 next appt in Aug. Appt with  PT 7/23. \par Patient states feeling well, no complaints. \par \par pt seeing much improvement in well being, ROM etc.

## 2021-07-26 ENCOUNTER — APPOINTMENT (OUTPATIENT)
Dept: PHYSICAL MEDICINE AND REHAB | Facility: CLINIC | Age: 71
End: 2021-07-26
Payer: MEDICARE

## 2021-07-26 PROCEDURE — 99214 OFFICE O/P EST MOD 30 MIN: CPT

## 2021-07-26 NOTE — REVIEW OF SYSTEMS
[Negative] : Psychiatric [FreeTextEntry9] : +Decreased left shoulder ROM, neck tightness  [de-identified] : +Submental edema

## 2021-07-26 NOTE — DATA REVIEWED
[FreeTextEntry1] : x-ray Left shoulder June 2021: No fracture, osteophyte formation from inferior humeral head.  No discrete lesions.

## 2021-07-26 NOTE — HISTORY OF PRESENT ILLNESS
[FreeTextEntry1] : Ms. Estrella is a 70 year old female with left buccal SCC, s/p Reconstruction of oral cavity and restoration of oral vestibule using left radial forearm free flap, repair of forearm donor site with V_Y advancement flap measuring approximately 120 cm2, and tracheostomy, 05/06/2021. \par \par Since her last visit she started PT.  She thinks her neck feels less tight.  She reports her shoulder ROM has improved.  She still does feel some edema under her chin.  She denies any pain. \par

## 2021-07-26 NOTE — ASSESSMENT
[FreeTextEntry1] : 70-year-old female presenting for evaluation.\par \par #Left shoulder decreased range of motion\par -Likely secondary to scapular winging.\par -Left shoulder x-ray reviewed\par -Continue physical therapy-Case discussed with Tami-PT, continue to focus on scapular stabilization and cervical ROM\par \par #Decreased cervical range of motion\par -Continue PT\par \par #Lymphedema:\par -Persistent edema likely secondary to lymphedema\par -Lymphedema education provided to patient.  \par -Start complete decongestive therapy/SLP\par \par Follow-up in 1 month.

## 2021-08-16 NOTE — H&P PST ADULT - GEN GEN HX ROS MEA POS PC
Malou Wilkinson is coming in today at 10:15 for the event monitor. We will schedule her for her follow up when she comes in the office today. lost 25 lbs in the past year -intentional through diet/exercise/weight loss

## 2021-08-20 ENCOUNTER — APPOINTMENT (OUTPATIENT)
Dept: OTOLARYNGOLOGY | Facility: CLINIC | Age: 71
End: 2021-08-20
Payer: MEDICARE

## 2021-08-20 VITALS
WEIGHT: 135 LBS | BODY MASS INDEX: 23.05 KG/M2 | TEMPERATURE: 96.7 F | DIASTOLIC BLOOD PRESSURE: 77 MMHG | HEIGHT: 64 IN | SYSTOLIC BLOOD PRESSURE: 120 MMHG | HEART RATE: 80 BPM

## 2021-08-20 PROCEDURE — 99214 OFFICE O/P EST MOD 30 MIN: CPT

## 2021-08-20 NOTE — HISTORY OF PRESENT ILLNESS
[de-identified] : 70 year old female with left buccal SCC, s/p Reconstruction of oral cavity and restoration of oral vestibule using left radial forearm free flap, repair of forearm donor site with VY advancement flap measuring approximately 120 cm2, and tracheostomy, 05/06/2021.  Pt is undergoing SLP, lymphadenopathy, PT at [facility].  Pt is having dentures made by Dr. Mosqueda and is seeing him next week.  Eating well. Denies pain.

## 2021-08-20 NOTE — PHYSICAL EXAM
[Normal] : no rashes [de-identified] : Incision healing well.  Trach site healing well.  No LAD. [de-identified] : L. arm healing well.   [FreeTextEntry1] : Flap is healing well.  No concerning lesions in the OC/OPx on inspection or palpation.\par

## 2021-08-30 ENCOUNTER — APPOINTMENT (OUTPATIENT)
Dept: PHYSICAL MEDICINE AND REHAB | Facility: CLINIC | Age: 71
End: 2021-08-30
Payer: MEDICARE

## 2021-08-30 DIAGNOSIS — I89.0 LYMPHEDEMA, NOT ELSEWHERE CLASSIFIED: ICD-10-CM

## 2021-08-30 DIAGNOSIS — Z85.89 PERSONAL HISTORY OF MALIGNANT NEOPLASM OF OTHER ORGANS AND SYSTEMS: ICD-10-CM

## 2021-08-30 DIAGNOSIS — G25.89 OTHER SPECIFIED EXTRAPYRAMIDAL AND MOVEMENT DISORDERS: ICD-10-CM

## 2021-08-30 DIAGNOSIS — M43.6 TORTICOLLIS: ICD-10-CM

## 2021-08-30 DIAGNOSIS — M25.612 STIFFNESS OF LEFT SHOULDER, NOT ELSEWHERE CLASSIFIED: ICD-10-CM

## 2021-08-30 PROCEDURE — 99214 OFFICE O/P EST MOD 30 MIN: CPT

## 2021-08-30 NOTE — PHYSICAL EXAM
[FreeTextEntry1] : Gen: Patient is A&O x 3, NAD\par HEENT: EOMI, hearing grossly normal\par Resp: regular, non - labored\par CV: pulses regular\par Skin: no rashes, erythema\par Lymph: +submental edema \par Inspection: left lateral scapula winging \par ROM: left shoulder FROM in abduction \par Palpation:no tenderness to palpation\par Sensation: intact to light touch\par Reflexes: 1+ and symmetric throughout\par Strength: 5/5 throughout\par Special tests: -Capps sign, -Spurling sign \par Gait: normal, non-antalgic\par \par

## 2021-08-30 NOTE — HISTORY OF PRESENT ILLNESS
[FreeTextEntry1] : Ms. Estrella is a 70 year old female with left buccal SCC, s/p Reconstruction of oral cavity and restoration of oral vestibule using left radial forearm free flap, repair of forearm donor site with V_Y advancement flap measuring approximately 120 cm2, and tracheostomy, 05/06/2021. \par \par Since her last visit she has obtained her compression garments.  She continues to perform self manual lymphatic drainage and is working with speech therapy.  She reports her cervical range of motion has improved.  She is no longer feeling as tight in her neck region.  She also reports her shoulder range of motion is improving.  She denies any overt weakness or new numbness and tingling.\par

## 2021-08-30 NOTE — REVIEW OF SYSTEMS
[Negative] : Psychiatric [FreeTextEntry9] : +Decreased left shoulder ROM, neck tightness  [de-identified] : +Submental edema

## 2021-08-30 NOTE — ASSESSMENT
[FreeTextEntry1] : 70-year-old female presenting for evaluation.\par \par #Left shoulder decreased range of motion\par -Likely secondary to scapular winging.\par -Continue physical therapy-Case discussed with Tami-PT\par \par #Decreased cervical range of motion\par -Continue PT\par \par #Lymphedema:\par -Lymphedema education provided to patient.  \par -Continue compression garment\par -Discussed pneumatic compression pump, will defer for now, continue self-mld\par -Case discussed with Allyssa, will discharge from lymphedema therapy \par \par Follow-up in 3 months.

## 2021-10-12 ENCOUNTER — APPOINTMENT (OUTPATIENT)
Dept: OTOLARYNGOLOGY | Facility: CLINIC | Age: 71
End: 2021-10-12
Payer: MEDICARE

## 2021-10-12 VITALS
HEIGHT: 64 IN | WEIGHT: 134 LBS | SYSTOLIC BLOOD PRESSURE: 120 MMHG | HEART RATE: 71 BPM | OXYGEN SATURATION: 100 % | TEMPERATURE: 96.9 F | BODY MASS INDEX: 22.88 KG/M2 | DIASTOLIC BLOOD PRESSURE: 77 MMHG

## 2021-10-12 PROCEDURE — 99213 OFFICE O/P EST LOW 20 MIN: CPT

## 2021-10-12 NOTE — PHYSICAL EXAM
[Midline] : trachea located in midline position [de-identified] : some granulation and erythema at L implant site but PEMA [Normal] : no rashes

## 2021-10-12 NOTE — CONSULT LETTER
[Dear  ___] : Dear  [unfilled], [Courtesy Letter:] : I had the pleasure of seeing your patient, [unfilled], in my office today. [Please see my note below.] : Please see my note below. [Sincerely,] : Sincerely, [FreeTextEntry3] : Yonis Hernández MD, FACS\par \par    [facility] [place]\par Associate Chair\par    Department of Otolaryngology\par \par Professor\par Otolaryngology & Molecular Medicine\par [facility] [employer]\par  [FreeTextEntry2] : Nighat Ramirez DDS ([place], [place])

## 2021-10-12 NOTE — HISTORY OF PRESENT ILLNESS
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 Has been in rehab.  Is for partial soon w Dr Mosqueda. now 5 mo out.

## 2021-10-25 ENCOUNTER — APPOINTMENT (OUTPATIENT)
Dept: OTOLARYNGOLOGY | Facility: CLINIC | Age: 71
End: 2021-10-25

## 2021-12-27 ENCOUNTER — APPOINTMENT (OUTPATIENT)
Dept: PHYSICAL MEDICINE AND REHAB | Facility: CLINIC | Age: 71
End: 2021-12-27

## 2022-01-11 ENCOUNTER — APPOINTMENT (OUTPATIENT)
Dept: OTOLARYNGOLOGY | Facility: CLINIC | Age: 72
End: 2022-01-11
Payer: MEDICARE

## 2022-01-11 VITALS
DIASTOLIC BLOOD PRESSURE: 77 MMHG | OXYGEN SATURATION: 99 % | BODY MASS INDEX: 23.56 KG/M2 | HEIGHT: 64 IN | SYSTOLIC BLOOD PRESSURE: 150 MMHG | WEIGHT: 138 LBS | HEART RATE: 81 BPM

## 2022-01-11 PROCEDURE — 99214 OFFICE O/P EST MOD 30 MIN: CPT

## 2022-01-11 NOTE — CONSULT LETTER
[Dear  ___] : Dear  [unfilled], [Courtesy Letter:] : I had the pleasure of seeing your patient, [unfilled], in my office today. [Please see my note below.] : Please see my note below. [Sincerely,] : Sincerely, [FreeTextEntry2] : Nighat Ramirez DDS ([place], [place]) [FreeTextEntry3] : Yonis Hernández MD, FACS\par \par    [facility] [place]\par Associate Chair\par    Department of Otolaryngology\par \par Professor\par Otolaryngology & Molecular Medicine\par [facility] [employer]\par

## 2022-01-11 NOTE — HISTORY OF PRESENT ILLNESS
[de-identified] : pt sp 5/21 darin resectio w RFFF and ND   node neg . no adj RT. was seeing Tena.  Was DC.  saw Dr Ramos. pt did get implants at time surgery.

## 2022-04-26 ENCOUNTER — APPOINTMENT (OUTPATIENT)
Dept: OTOLARYNGOLOGY | Facility: CLINIC | Age: 72
End: 2022-04-26
Payer: MEDICARE

## 2022-04-26 VITALS
HEART RATE: 83 BPM | SYSTOLIC BLOOD PRESSURE: 142 MMHG | DIASTOLIC BLOOD PRESSURE: 80 MMHG | BODY MASS INDEX: 23.56 KG/M2 | HEIGHT: 64 IN | WEIGHT: 138 LBS | OXYGEN SATURATION: 100 %

## 2022-04-26 PROCEDURE — 99213 OFFICE O/P EST LOW 20 MIN: CPT

## 2022-04-26 NOTE — HISTORY OF PRESENT ILLNESS
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 Is doing well w no general cxs\par \par undergoing dental rehab

## 2022-04-26 NOTE — CONSULT LETTER
[Dear  ___] : Dear  [unfilled], [Courtesy Letter:] : I had the pleasure of seeing your patient, [unfilled], in my office today. [Please see my note below.] : Please see my note below. [Sincerely,] : Sincerely, [FreeTextEntry2] : Nighat Ramirez DDS ([place], [place]) [FreeTextEntry3] : Yonis Hernández MD, FACS\par \par    [facility] [place]\par Associate Chair\par    Department of Otolaryngology\par \par Professor\par Otolaryngology & Molecular Medicine\par [facility] [employer]\par

## 2022-06-23 ENCOUNTER — OUTPATIENT (OUTPATIENT)
Dept: OUTPATIENT SERVICES | Facility: HOSPITAL | Age: 72
LOS: 1 days | End: 2022-06-23

## 2022-06-23 VITALS
RESPIRATION RATE: 16 BRPM | TEMPERATURE: 98 F | OXYGEN SATURATION: 100 % | WEIGHT: 139.99 LBS | HEART RATE: 82 BPM | DIASTOLIC BLOOD PRESSURE: 74 MMHG | SYSTOLIC BLOOD PRESSURE: 108 MMHG | HEIGHT: 64.5 IN

## 2022-06-23 DIAGNOSIS — C41.1 MALIGNANT NEOPLASM OF MANDIBLE: ICD-10-CM

## 2022-06-23 DIAGNOSIS — Z86.79 PERSONAL HISTORY OF OTHER DISEASES OF THE CIRCULATORY SYSTEM: ICD-10-CM

## 2022-06-23 DIAGNOSIS — Z98.890 OTHER SPECIFIED POSTPROCEDURAL STATES: Chronic | ICD-10-CM

## 2022-06-23 LAB
ANION GAP SERPL CALC-SCNC: 11 MMOL/L — SIGNIFICANT CHANGE UP (ref 7–14)
BUN SERPL-MCNC: 11 MG/DL — SIGNIFICANT CHANGE UP (ref 7–23)
CALCIUM SERPL-MCNC: 9.3 MG/DL — SIGNIFICANT CHANGE UP (ref 8.4–10.5)
CHLORIDE SERPL-SCNC: 102 MMOL/L — SIGNIFICANT CHANGE UP (ref 98–107)
CO2 SERPL-SCNC: 26 MMOL/L — SIGNIFICANT CHANGE UP (ref 22–31)
CREAT SERPL-MCNC: 0.82 MG/DL — SIGNIFICANT CHANGE UP (ref 0.5–1.3)
EGFR: 77 ML/MIN/1.73M2 — SIGNIFICANT CHANGE UP
GLUCOSE SERPL-MCNC: 92 MG/DL — SIGNIFICANT CHANGE UP (ref 70–99)
HCT VFR BLD CALC: 45.4 % — HIGH (ref 34.5–45)
HGB BLD-MCNC: 15.1 G/DL — SIGNIFICANT CHANGE UP (ref 11.5–15.5)
MCHC RBC-ENTMCNC: 32.3 PG — SIGNIFICANT CHANGE UP (ref 27–34)
MCHC RBC-ENTMCNC: 33.3 GM/DL — SIGNIFICANT CHANGE UP (ref 32–36)
MCV RBC AUTO: 97 FL — SIGNIFICANT CHANGE UP (ref 80–100)
NRBC # BLD: 0 /100 WBCS — SIGNIFICANT CHANGE UP
NRBC # FLD: 0 K/UL — SIGNIFICANT CHANGE UP
PLATELET # BLD AUTO: 213 K/UL — SIGNIFICANT CHANGE UP (ref 150–400)
POTASSIUM SERPL-MCNC: 4.2 MMOL/L — SIGNIFICANT CHANGE UP (ref 3.5–5.3)
POTASSIUM SERPL-SCNC: 4.2 MMOL/L — SIGNIFICANT CHANGE UP (ref 3.5–5.3)
RBC # BLD: 4.68 M/UL — SIGNIFICANT CHANGE UP (ref 3.8–5.2)
RBC # FLD: 12.5 % — SIGNIFICANT CHANGE UP (ref 10.3–14.5)
SODIUM SERPL-SCNC: 139 MMOL/L — SIGNIFICANT CHANGE UP (ref 135–145)
WBC # BLD: 4.33 K/UL — SIGNIFICANT CHANGE UP (ref 3.8–10.5)
WBC # FLD AUTO: 4.33 K/UL — SIGNIFICANT CHANGE UP (ref 3.8–10.5)

## 2022-06-23 PROCEDURE — 93010 ELECTROCARDIOGRAM REPORT: CPT

## 2022-06-23 RX ORDER — PREGABALIN 225 MG/1
1 CAPSULE ORAL
Qty: 0 | Refills: 0 | DISCHARGE

## 2022-06-23 RX ORDER — IBUPROFEN 200 MG
2 TABLET ORAL
Qty: 0 | Refills: 0 | DISCHARGE

## 2022-06-23 RX ORDER — CHOLECALCIFEROL (VITAMIN D3) 125 MCG
1 CAPSULE ORAL
Qty: 0 | Refills: 0 | DISCHARGE

## 2022-06-23 NOTE — H&P PST ADULT - PROBLEM SELECTOR PLAN 2
Pt reports she has appt with Cardiologist tomorrow 6/24/22 for preop eval, requested on chart.  Most recent echo requested on chart

## 2022-06-23 NOTE — H&P PST ADULT - ENMT COMMENTS
hx malignant neoplasm of left lower  mandible, s/p excision of neoplasm, extraction of 5 teeth, and placement of implants and graft taken from forearm.  Pt reports graft is too thick and pulling away from implant, now for Vestibuloplasty

## 2022-06-23 NOTE — H&P PST ADULT - ASSESSMENT
72y/o female with hx malignant neoplasm of left lower  mandible, s/p excision of neoplasm, extraction of 5 teeth, left neck dissection,  and placement of implants and graft taken from forearm.  Pt reports graft is too thick and pulling away from implant, now for Vestibuloplasty 7/14/22

## 2022-06-23 NOTE — H&P PST ADULT - HISTORY OF PRESENT ILLNESS
72y/o female with hx malignant neoplasm of left lower  mandible, s/p excision of neoplasm, extraction of 5 teeth, and placement of implants and graft taken from forearm.  Pt reports graft is too thick and pulling away from implant, now for Vestibuloplasty 72y/o female with hx malignant neoplasm of left lower  mandible, s/p excision of neoplasm, extraction of 5 teeth, left neck dissection,  and placement of implants and graft taken from forearm.  Pt reports graft is too thick and pulling away from implant, now for Vestibuloplasty

## 2022-06-23 NOTE — H&P PST ADULT - CARDIOVASCULAR COMMENTS
Pt reports hx of aortic valve insufficiency, followed by Cardiologist.  Pt reports hx of palpitations, no recent episodes.  she will see Cardiologist tomorrow for preop eval

## 2022-06-23 NOTE — H&P PST ADULT - NSICDXPASTMEDICALHX_GEN_ALL_CORE_FT
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

## 2022-06-23 NOTE — H&P PST ADULT - BSA (M2)
Dear Wesley,     My name is TERENCE Colón, and I recently had the pleasure of evaluating information related to your medical condition on our [employer] digital platform. Based on the information available, I have determined that your request for care was unable to be safely and effectively completed. Due to this, it is my recommendation that you seek care at a local Urgent Care or Immediate Care Center for an expedited and thorough face-to-face evaluation and consideration for diagnostic testing.     Virtual [facility] does not provide assessment or treatment of ear complaints as, per policy, requires direct visualization of ear canal and tympanic membrane to determine treatment.      Please note that the [employer] department that you submitted your request for care is equivalent to a virtual format of Urgent Care or Immediate Care level of care. If you did not intend to seek this particular level of care and potentially had a scheduled appointment with another provider, we recommend that you directly call the office of the provider you are attempting to meet with to rectify connection issues.     Your care is very important to us. Please do not delay in acting on the recommendations for your care listed above. If you feel you may be experiencing a medical emergency, contact 911 immediately. If you have any questions regarding your visit, you may contact us at [phone].     Thank You,     TERENCE Colón   
1.69

## 2022-06-23 NOTE — H&P PST ADULT - MARITAL STATUS
2 children, mother  84y/o heart problems, father  53y/o lung cancer, 4 siblings 1 brother  68y/o lung cancer/

## 2022-10-11 ENCOUNTER — APPOINTMENT (OUTPATIENT)
Dept: OTOLARYNGOLOGY | Facility: CLINIC | Age: 72
End: 2022-10-11

## 2022-10-11 VITALS
SYSTOLIC BLOOD PRESSURE: 130 MMHG | BODY MASS INDEX: 23.9 KG/M2 | HEART RATE: 76 BPM | DIASTOLIC BLOOD PRESSURE: 74 MMHG | HEIGHT: 64 IN | WEIGHT: 140 LBS

## 2022-10-11 PROBLEM — C03.9 MALIGNANT NEOPLASM OF GUM, UNSPECIFIED: Chronic | Status: ACTIVE | Noted: 2021-04-23

## 2022-10-11 PROCEDURE — 99214 OFFICE O/P EST MOD 30 MIN: CPT

## 2022-10-11 NOTE — REASON FOR VISIT
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

## 2022-10-11 NOTE — CONSULT LETTER
[Dear  ___] : Dear  [unfilled], [Please see my note below.] : Please see my note below. [Sincerely,] : Sincerely, [Courtesy Letter:] : I had the pleasure of seeing your patient, [unfilled], in my office today. [FreeTextEntry2] : Nighat Ramirez DDS ([place], [place]) \par  [FreeTextEntry3] : Yonis Hernández MD, FACS\par \par [facility] [place]\par Associate Chair\par Department of Otolaryngology\par \par Professor\par Otolaryngology & Molecular Medicine\par [facility]

## 2022-10-11 NOTE — HISTORY OF PRESENT ILLNESS
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 States doing well and still undergoing dental rehab. States numbness in chin and left jawline. Denies new lesion. Denies dysphagia, odynophagia, aspirations, dyspnea, dysphonia, otalgia. \par

## 2022-11-03 ENCOUNTER — OFFICE (OUTPATIENT)
Dept: URBAN - METROPOLITAN AREA CLINIC 12 | Facility: CLINIC | Age: 72
Setting detail: OPHTHALMOLOGY
End: 2022-11-03
Payer: MEDICARE

## 2022-11-03 ENCOUNTER — OFFICE (OUTPATIENT)
Dept: URBAN - METROPOLITAN AREA CLINIC 12 | Facility: CLINIC | Age: 72
Setting detail: OPHTHALMOLOGY
End: 2022-11-03

## 2022-11-03 DIAGNOSIS — H90.3: ICD-10-CM

## 2022-11-03 DIAGNOSIS — H40.013: ICD-10-CM

## 2022-11-03 PROCEDURE — 92551 PURE TONE HEARING TEST AIR: CPT

## 2022-11-03 PROCEDURE — 99213 OFFICE O/P EST LOW 20 MIN: CPT | Performed by: OPHTHALMOLOGY

## 2022-11-03 PROCEDURE — 92133 CPTRZD OPH DX IMG PST SGM ON: CPT | Performed by: OPHTHALMOLOGY

## 2022-11-03 ASSESSMENT — SPHEQUIV_DERIVED
OD_SPHEQUIV: 0.875
OS_SPHEQUIV: 0.375
OD_SPHEQUIV: 0.625
OD_SPHEQUIV: 0.5
OS_SPHEQUIV: 0.875
OS_SPHEQUIV: 1.25

## 2022-11-03 ASSESSMENT — REFRACTION_CURRENTRX
OS_SPHERE: +2.75
OS_ADD: +2.25
OS_SPHERE: +1.50
OD_AXIS: 114
OD_SPHERE: +3.25
OD_VPRISM_DIRECTION: SV
OD_OVR_VA: 20/
OS_AXIS: 087
OD_AXIS: 089
OS_OVR_VA: 20/
OD_ADD: +2.25
OD_VPRISM_DIRECTION: BF
OS_CYLINDER: -0.25
OD_CYLINDER: -0.25
OS_OVR_VA: 20/
OS_CYLINDER: -0.50
OS_VPRISM_DIRECTION: BF
OD_SPHERE: +0.75
OS_VPRISM_DIRECTION: SV
OS_AXIS: 085
OD_OVR_VA: 20/
OD_CYLINDER: -0.50

## 2022-11-03 ASSESSMENT — VISUAL ACUITY
OS_BCVA: 20/20-
OD_BCVA: 20/25-2

## 2022-11-03 ASSESSMENT — REFRACTION_AUTOREFRACTION
OD_AXIS: 126
OD_CYLINDER: -0.25
OS_AXIS: 072
OS_SPHERE: +1.00
OS_CYLINDER: -0.25
OD_SPHERE: +0.75

## 2022-11-03 ASSESSMENT — KERATOMETRY
OS_AXISANGLE_DEGREES: 097
OD_AXISANGLE_DEGREES: 072
METHOD_AUTO_MANUAL: AUTO
OS_K1POWER_DIOPTERS: 44.00
OD_K2POWER_DIOPTERS: 44.25
OS_K2POWER_DIOPTERS: 44.50
OD_K1POWER_DIOPTERS: 44.00

## 2022-11-03 ASSESSMENT — REFRACTION_MANIFEST
OD_VA1: 20/20
OS_AXIS: 95
OS_ADD: +2.25
OD_CYLINDER: -0.50
OD_ADD: +2.25
OS_AXIS: 090
OD_CYLINDER: -0.25
OD_VA1: 20/20-
OS_ADD: +2.25
OS_CYLINDER: -0.50
OD_SPHERE: +1.00
OD_ADD: +2.25
OD_SPHERE: +0.75
OS_CYLINDER: -0.25
OS_SPHERE: +1.50
OS_VA1: 20/20
OD_AXIS: 095
OS_VA1: 20/20
OD_AXIS: 110
OS_SPHERE: +0.50

## 2022-11-03 ASSESSMENT — AXIALLENGTH_DERIVED
OD_AL: 23.175
OS_AL: 22.8533
OS_AL: 22.9912
OD_AL: 23.1281
OD_AL: 23.0349
OS_AL: 23.1777

## 2022-11-03 ASSESSMENT — CONFRONTATIONAL VISUAL FIELD TEST (CVF)
OD_FINDINGS: FULL
OS_FINDINGS: FULL

## 2022-11-03 ASSESSMENT — TONOMETRY
OD_IOP_MMHG: 18
OS_IOP_MMHG: 17

## 2022-11-03 ASSESSMENT — PACHYMETRY
OD_CT_CORRECTION: -1
OD_CT_UM: 557
OS_CT_CORRECTION: -1
OS_CT_UM: 564

## 2022-12-06 NOTE — DIETITIAN INITIAL EVALUATION ADULT. - CONTINUE CURRENT NUTRITION CARE PLAN
No new care gaps identified.  [facility] Embedded Care Gaps. Reference number: [id number]. 12/06/2022   8:24:12 AM CST   yes

## 2023-01-24 ENCOUNTER — APPOINTMENT (OUTPATIENT)
Dept: OTOLARYNGOLOGY | Facility: CLINIC | Age: 73
End: 2023-01-24
Payer: MEDICARE

## 2023-01-24 VITALS
HEART RATE: 88 BPM | BODY MASS INDEX: 23.9 KG/M2 | DIASTOLIC BLOOD PRESSURE: 84 MMHG | OXYGEN SATURATION: 98 % | RESPIRATION RATE: 16 BRPM | HEIGHT: 64 IN | SYSTOLIC BLOOD PRESSURE: 157 MMHG | WEIGHT: 140 LBS | TEMPERATURE: 97.7 F

## 2023-01-24 PROCEDURE — 99214 OFFICE O/P EST MOD 30 MIN: CPT

## 2023-01-24 NOTE — HISTORY OF PRESENT ILLNESS
[de-identified] : Ms. Estrella presents for 3 month surveillance follow up. 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 Pt is 1 year 8 mo  out\par

## 2023-01-24 NOTE — CONSULT LETTER
[Dear  ___] : Dear  [unfilled], [Courtesy Letter:] : I had the pleasure of seeing your patient, [unfilled], in my office today. [Please see my note below.] : Please see my note below. [Sincerely,] : Sincerely, [FreeTextEntry2] : Nighat Ramirez DDS ([place], [place]) [FreeTextEntry3] : Yonis Hernández MD, FACS\par \par    [facility] [place]\par Associate Chair\par    Department of Otolaryngology\par \par Professor\par Otolaryngology & Molecular Medicine\par [facility]\par

## 2023-03-30 ENCOUNTER — NON-APPOINTMENT (OUTPATIENT)
Age: 73
End: 2023-03-30

## 2023-05-03 ENCOUNTER — NON-APPOINTMENT (OUTPATIENT)
Age: 73
End: 2023-05-03

## 2023-05-09 ENCOUNTER — APPOINTMENT (OUTPATIENT)
Dept: OTOLARYNGOLOGY | Facility: CLINIC | Age: 73
End: 2023-05-09

## 2023-05-10 ENCOUNTER — APPOINTMENT (OUTPATIENT)
Dept: OTOLARYNGOLOGY | Facility: CLINIC | Age: 73
End: 2023-05-10
Payer: MEDICARE

## 2023-05-10 VITALS
WEIGHT: 140 LBS | SYSTOLIC BLOOD PRESSURE: 138 MMHG | BODY MASS INDEX: 23.9 KG/M2 | HEART RATE: 80 BPM | DIASTOLIC BLOOD PRESSURE: 81 MMHG | HEIGHT: 64 IN | OXYGEN SATURATION: 98 %

## 2023-05-10 PROCEDURE — 99214 OFFICE O/P EST MOD 30 MIN: CPT

## 2023-05-10 NOTE — HISTORY OF PRESENT ILLNESS
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 Pt is 2 years our of treatment s/p exc sup scca L linnette gingiva. \par States completed dental rehab--continues to have numbness in chin and left jawline. \par Reports she would like to have her upper left mouth checked.\par Denies new lesions, dysphagia, odynophagia, dyspnea, dysphonia, hemoptysis, mucus production or otalgia. \par Denies recent fevers/infections, chills, night sweats, weight loss.

## 2023-05-11 ENCOUNTER — OFFICE (OUTPATIENT)
Dept: URBAN - METROPOLITAN AREA CLINIC 100 | Facility: CLINIC | Age: 73
Setting detail: OPHTHALMOLOGY
End: 2023-05-11
Payer: MEDICARE

## 2023-05-11 DIAGNOSIS — H40.013: ICD-10-CM

## 2023-05-11 DIAGNOSIS — H25.13: ICD-10-CM

## 2023-05-11 PROCEDURE — 99213 OFFICE O/P EST LOW 20 MIN: CPT | Performed by: OPHTHALMOLOGY

## 2023-05-11 PROCEDURE — 92083 EXTENDED VISUAL FIELD XM: CPT | Performed by: OPHTHALMOLOGY

## 2023-05-11 PROCEDURE — 92020 GONIOSCOPY: CPT | Performed by: OPHTHALMOLOGY

## 2023-05-11 ASSESSMENT — REFRACTION_CURRENTRX
OS_CYLINDER: -0.25
OS_SPHERE: +1.50
OD_AXIS: 095
OD_VPRISM_DIRECTION: BF
OD_CYLINDER: -0.25
OD_AXIS: 114
OS_VPRISM_DIRECTION: SV
OD_OVR_VA: 20/
OS_ADD: +2.25
OS_SPHERE: +2.75
OD_ADD: +2.25
OS_CYLINDER: -0.50
OD_SPHERE: +0.75
OD_SPHERE: +3.25
OS_OVR_VA: 20/
OS_AXIS: 090
OD_OVR_VA: 20/
OD_CYLINDER: -0.50
OS_OVR_VA: 20/
OD_VPRISM_DIRECTION: SV
OS_AXIS: 085
OS_VPRISM_DIRECTION: BF

## 2023-05-11 ASSESSMENT — REFRACTION_MANIFEST
OD_SPHERE: +1.00
OD_ADD: +2.25
OS_ADD: +2.25
OD_CYLINDER: -0.50
OS_VA1: 20/20
OS_SPHERE: +1.50
OD_VA1: 20/20-
OS_VA1: 20/20
OD_VA1: 20/20
OD_AXIS: 095
OS_AXIS: 95
OD_AXIS: 110
OS_CYLINDER: -0.25
OD_CYLINDER: -0.25
OD_ADD: +2.25
OS_ADD: +2.25
OS_AXIS: 090
OS_CYLINDER: -0.50
OD_SPHERE: +0.75
OS_SPHERE: +0.50

## 2023-05-11 ASSESSMENT — CONFRONTATIONAL VISUAL FIELD TEST (CVF)
OS_FINDINGS: FULL
OD_FINDINGS: FULL

## 2023-05-11 ASSESSMENT — AXIALLENGTH_DERIVED
OD_AL: 23.2194
OD_AL: 23.2194
OD_AL: 23.0788
OS_AL: 22.9398
OS_AL: 22.9859
OS_AL: 23.2667

## 2023-05-11 ASSESSMENT — REFRACTION_AUTOREFRACTION
OD_AXIS: 136
OS_SPHERE: +1.50
OS_AXIS: 083
OD_CYLINDER: -0.50
OD_SPHERE: +0.75
OS_CYLINDER: -0.75

## 2023-05-11 ASSESSMENT — SPHEQUIV_DERIVED
OS_SPHEQUIV: 0.375
OS_SPHEQUIV: 1.125
OD_SPHEQUIV: 0.875
OD_SPHEQUIV: 0.5
OD_SPHEQUIV: 0.5
OS_SPHEQUIV: 1.25

## 2023-05-11 ASSESSMENT — VISUAL ACUITY
OD_BCVA: 20/25-2
OS_BCVA: 20/30-2

## 2023-05-11 ASSESSMENT — KERATOMETRY
OD_K2POWER_DIOPTERS: 44.00
METHOD_AUTO_MANUAL: AUTO
OS_K1POWER_DIOPTERS: 44.00
OD_AXISANGLE_DEGREES: 0
OD_K1POWER_DIOPTERS: 44.00
OS_AXISANGLE_DEGREES: 0
OS_K2POWER_DIOPTERS: 44.00

## 2023-05-11 ASSESSMENT — PACHYMETRY
OD_CT_UM: 557
OD_CT_CORRECTION: -1
OS_CT_CORRECTION: -1
OS_CT_UM: 564

## 2023-05-11 ASSESSMENT — TONOMETRY: OS_IOP_MMHG: 21

## 2023-06-05 ENCOUNTER — APPOINTMENT (OUTPATIENT)
Dept: OTOLARYNGOLOGY | Facility: CLINIC | Age: 73
End: 2023-06-05
Payer: MEDICARE

## 2023-06-05 VITALS
DIASTOLIC BLOOD PRESSURE: 75 MMHG | SYSTOLIC BLOOD PRESSURE: 132 MMHG | HEART RATE: 78 BPM | HEIGHT: 64 IN | BODY MASS INDEX: 24.24 KG/M2 | TEMPERATURE: 97.1 F | WEIGHT: 142 LBS

## 2023-06-05 PROCEDURE — 99214 OFFICE O/P EST MOD 30 MIN: CPT

## 2023-06-05 RX ORDER — LEVOCETIRIZINE DIHYDROCHLORIDE 5 MG/1
5 TABLET ORAL
Qty: 10 | Refills: 0 | Status: COMPLETED | COMMUNITY
Start: 2023-01-03

## 2023-06-05 RX ORDER — CHROMIUM 200 MCG
TABLET ORAL
Refills: 0 | Status: COMPLETED | COMMUNITY
End: 2023-06-05

## 2023-06-05 RX ORDER — AMOXICILLIN 500 MG/1
500 CAPSULE ORAL
Qty: 30 | Refills: 0 | Status: COMPLETED | COMMUNITY
Start: 2022-12-09

## 2023-06-05 RX ORDER — ACETAMINOPHEN 325 MG/1
TABLET, FILM COATED ORAL
Refills: 0 | Status: COMPLETED | COMMUNITY
End: 2023-06-05

## 2023-06-05 RX ORDER — BENZONATATE 200 MG/1
200 CAPSULE ORAL
Qty: 21 | Refills: 0 | Status: COMPLETED | COMMUNITY
Start: 2023-03-31

## 2023-06-05 RX ORDER — COVID-19 MOLECULAR TEST ASSAY
KIT MISCELLANEOUS
Qty: 4 | Refills: 0 | Status: COMPLETED | COMMUNITY
Start: 2023-05-09

## 2023-06-05 NOTE — PHYSICAL EXAM
[Normal] : no rashes [de-identified] : Incision well healed, trach site well healed. No LAD.  Just superior to the incision is a nodular skin lesion, feels superficial, not involving the incision, firm, no TTP, no bleeding. [FreeTextEntry1] : Flap is well healed.  No concerning lesions in the OC/OPx on inspection or palpation.\par  [de-identified] : L. arm well healed.

## 2023-06-05 NOTE — HISTORY OF PRESENT ILLNESS
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  Pt is being f/u by Dr. Mosqueda and Dr. Hernández. Pt notices left neck incisional site with small granulate lump/pimple like since 9/2022. no change in size, no bleeding or oozing.

## 2023-06-30 ENCOUNTER — LABORATORY RESULT (OUTPATIENT)
Age: 73
End: 2023-06-30

## 2023-06-30 ENCOUNTER — APPOINTMENT (OUTPATIENT)
Dept: OTOLARYNGOLOGY | Facility: CLINIC | Age: 73
End: 2023-06-30
Payer: MEDICARE

## 2023-06-30 VITALS
HEART RATE: 72 BPM | BODY MASS INDEX: 24.41 KG/M2 | HEIGHT: 64 IN | OXYGEN SATURATION: 99 % | WEIGHT: 143 LBS | DIASTOLIC BLOOD PRESSURE: 84 MMHG | SYSTOLIC BLOOD PRESSURE: 121 MMHG

## 2023-06-30 PROCEDURE — 14040 TIS TRNFR F/C/C/M/N/A/G/H/F: CPT

## 2023-06-30 NOTE — HISTORY OF PRESENT ILLNESS
[de-identified] : Ms. Estrella presents today for removal of nodular lesion above her pervious incision line under local anesthesia.\par hx. 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

## 2023-06-30 NOTE — PROCEDURE
[FreeTextEntry1] : Excision of neck lesion, local tissue rearrangement measuring approx. 8 sq. cm. [FreeTextEntry2] : Neck lesion above incision line in patient with previous history of buccal SCCa [FreeTextEntry3] : After patient gave consent, an incision was marked in a fashion to be able to rotate the superior flap into the defect to have the final scar lie within the same line as her previous neck dissection scar.  This was then infiltrated with 1% lidocaine with 1:100 K epinephrine.  The neck was then prepped and draped in the standard sterile fashion.  Our incision was then made and taken down through the underlying scar and subcutaneous tissues down to the platysma.  The entire lesion was then resected.  We then elevated our superior and inferior flaps and made a back cut along the posterior aspect of the superior flap to release it further to achieve the desired closure as described above.  Hemostasis was confirmed.  The wound was then closed in layers with sutures of 4-0 chromic and 5-0 nylon.  Bacitracin was applied.  The specimen was sent to Pathology.  The patient tolerated the procedure well.

## 2023-06-30 NOTE — PHYSICAL EXAM
[de-identified] : Incision well healed, trach site well healed. No LAD.  Just superior to the incision is a nodular skin lesion, feels superficial, not involving the incision, firm, no TTP, no bleeding. [FreeTextEntry1] : Flap is well healed.  No concerning lesions in the OC/OPx on inspection or palpation.\par  [Normal] : no rashes [de-identified] : L. arm well healed.

## 2023-07-06 ENCOUNTER — APPOINTMENT (OUTPATIENT)
Dept: OTOLARYNGOLOGY | Facility: CLINIC | Age: 73
End: 2023-07-06
Payer: MEDICARE

## 2023-07-06 PROCEDURE — 99024 POSTOP FOLLOW-UP VISIT: CPT

## 2023-07-06 NOTE — HISTORY OF PRESENT ILLNESS
[de-identified] : Ms. Estrella is s/p excision of neck lesion,under local anesthesia on 6/30/2023. Denies fever,pain, dysphagia, dysphonia and or dyspnea \par

## 2023-09-19 ENCOUNTER — NON-APPOINTMENT (OUTPATIENT)
Age: 73
End: 2023-09-19

## 2023-09-20 ENCOUNTER — APPOINTMENT (OUTPATIENT)
Dept: OTOLARYNGOLOGY | Facility: CLINIC | Age: 73
End: 2023-09-20
Payer: MEDICARE

## 2023-09-20 VITALS
OXYGEN SATURATION: 97 % | HEART RATE: 85 BPM | BODY MASS INDEX: 24.75 KG/M2 | DIASTOLIC BLOOD PRESSURE: 75 MMHG | HEIGHT: 64 IN | SYSTOLIC BLOOD PRESSURE: 116 MMHG | WEIGHT: 145 LBS

## 2023-09-20 DIAGNOSIS — K13.70 UNSPECIFIED LESIONS OF ORAL MUCOSA: ICD-10-CM

## 2023-09-20 PROCEDURE — 99214 OFFICE O/P EST MOD 30 MIN: CPT | Mod: 24

## 2023-10-13 ENCOUNTER — APPOINTMENT (OUTPATIENT)
Dept: OTOLARYNGOLOGY | Facility: CLINIC | Age: 73
End: 2023-10-13
Payer: MEDICARE

## 2023-10-13 VITALS
OXYGEN SATURATION: 99 % | HEIGHT: 64 IN | SYSTOLIC BLOOD PRESSURE: 138 MMHG | HEART RATE: 76 BPM | DIASTOLIC BLOOD PRESSURE: 73 MMHG | BODY MASS INDEX: 24.75 KG/M2 | WEIGHT: 145 LBS | RESPIRATION RATE: 16 BRPM

## 2023-10-13 DIAGNOSIS — L98.9 DISORDER OF THE SKIN AND SUBCUTANEOUS TISSUE, UNSPECIFIED: ICD-10-CM

## 2023-10-13 DIAGNOSIS — C06.9 MALIGNANT NEOPLASM OF MOUTH, UNSPECIFIED: ICD-10-CM

## 2023-10-13 PROCEDURE — 99213 OFFICE O/P EST LOW 20 MIN: CPT

## 2023-10-13 RX ORDER — CEPHALEXIN 500 MG/1
500 TABLET ORAL
Qty: 20 | Refills: 0 | Status: COMPLETED | COMMUNITY
Start: 2023-06-30 | End: 2023-08-10

## 2023-11-26 ENCOUNTER — TRANSCRIPTION ENCOUNTER (OUTPATIENT)
Age: 73
End: 2023-11-26

## 2023-12-27 ENCOUNTER — OFFICE (OUTPATIENT)
Dept: URBAN - METROPOLITAN AREA CLINIC 100 | Facility: CLINIC | Age: 73
Setting detail: OPHTHALMOLOGY
End: 2023-12-27
Payer: MEDICARE

## 2023-12-27 DIAGNOSIS — H43.393: ICD-10-CM

## 2023-12-27 DIAGNOSIS — H25.13: ICD-10-CM

## 2023-12-27 DIAGNOSIS — H40.013: ICD-10-CM

## 2023-12-27 DIAGNOSIS — H52.7: ICD-10-CM

## 2023-12-27 PROCEDURE — 92015 DETERMINE REFRACTIVE STATE: CPT | Performed by: OPHTHALMOLOGY

## 2023-12-27 PROCEDURE — 92250 FUNDUS PHOTOGRAPHY W/I&R: CPT | Performed by: OPHTHALMOLOGY

## 2023-12-27 PROCEDURE — 92014 COMPRE OPH EXAM EST PT 1/>: CPT | Performed by: OPHTHALMOLOGY

## 2023-12-27 ASSESSMENT — REFRACTION_AUTOREFRACTION
OS_SPHERE: +1.00
OD_AXIS: 145
OD_CYLINDER: -0.75
OS_CYLINDER: -0.50
OS_AXIS: 095
OD_SPHERE: +0.50

## 2023-12-27 ASSESSMENT — REFRACTION_CURRENTRX
OD_CYLINDER: -0.50
OD_VPRISM_DIRECTION: SV
OD_ADD: +2.25
OS_OVR_VA: 20/
OD_CYLINDER: -0.25
OS_VPRISM_DIRECTION: SV
OD_SPHERE: +0.75
OS_CYLINDER: -0.25
OS_SPHERE: +2.75
OD_VPRISM_DIRECTION: BF
OS_AXIS: 090
OS_SPHERE: +1.50
OD_AXIS: 095
OS_OVR_VA: 20/
OS_ADD: +2.25
OS_AXIS: 085
OD_OVR_VA: 20/
OS_VPRISM_DIRECTION: BF
OD_OVR_VA: 20/
OS_CYLINDER: -0.50
OD_SPHERE: +3.25
OD_AXIS: 114

## 2023-12-27 ASSESSMENT — CONFRONTATIONAL VISUAL FIELD TEST (CVF)
OD_FINDINGS: FULL
OS_FINDINGS: FULL

## 2023-12-27 ASSESSMENT — REFRACTION_MANIFEST
OD_SPHERE: +0.75
OS_SPHERE: +0.75
OS_ADD: +2.50
OD_VA1: 20/20
OS_VA1: 20/20
OD_AXIS: 145
OD_SPHERE: +0.75
OS_ADD: +2.25
OD_ADD: +2.25
OS_VA1: 20/20
OD_ADD: +2.50
OD_AXIS: 145
OU_VA: 20/20
OD_VA1: 20/20
OD_CYLINDER: -0.25
OD_CYLINDER: -0.25
OU_VA: 20/20
OS_CYLINDER: SPH
OS_SPHERE: +0.75
OS_CYLINDER: SPH

## 2023-12-27 ASSESSMENT — SPHEQUIV_DERIVED
OS_SPHEQUIV: 0.75
OD_SPHEQUIV: 0.125
OD_SPHEQUIV: 0.625
OD_SPHEQUIV: 0.625

## 2024-02-07 ENCOUNTER — APPOINTMENT (OUTPATIENT)
Dept: OTOLARYNGOLOGY | Facility: CLINIC | Age: 74
End: 2024-02-07
Payer: MEDICARE

## 2024-02-07 VITALS
DIASTOLIC BLOOD PRESSURE: 85 MMHG | SYSTOLIC BLOOD PRESSURE: 135 MMHG | WEIGHT: 145 LBS | OXYGEN SATURATION: 98 % | BODY MASS INDEX: 24.75 KG/M2 | HEIGHT: 64 IN | HEART RATE: 72 BPM

## 2024-02-07 PROCEDURE — 99214 OFFICE O/P EST MOD 30 MIN: CPT

## 2024-02-07 NOTE — CONSULT LETTER
[Dear  ___] : Dear  [unfilled], [Courtesy Letter:] : I had the pleasure of seeing your patient, [unfilled], in my office today. [Please see my note below.] : Please see my note below. [Sincerely,] : Sincerely, [FreeTextEntry2] : Nighat Ramirez DDS ([place], [place]) [FreeTextEntry3] : Yonis Hernández MD, FACS\par  \par     [facility] [place]\par  Associate Chair\par     Department of Otolaryngology\par  \par  Professor\par  Otolaryngology & Molecular Medicine\par  [facility] [employer]\par

## 2024-02-07 NOTE — HISTORY OF PRESENT ILLNESS
[de-identified] : Ms. 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 She is 2 years and 9 months out of treatment. Denies pain, dysphagia,  odynophagia, dysphonia and or dyspnea. On 6/30/2023, Dr. Dill removed neck lesion - Epidermal hyperplasia and dermal suppurative granulomatous  inflammation. pt is for knee surgery

## 2024-02-07 NOTE — PHYSICAL EXAM
[Midline] : trachea located in midline position [de-identified] : stable hyperpigmentation in area upper and lower lips [Normal] : no rashes

## 2024-03-12 ENCOUNTER — APPOINTMENT (OUTPATIENT)
Age: 74
End: 2024-03-12
Payer: MEDICARE

## 2024-03-12 PROCEDURE — 99213 OFFICE O/P EST LOW 20 MIN: CPT

## 2024-03-12 PROCEDURE — 70350 X-RAY HEAD FOR ORTHODONTIA: CPT | Mod: 26

## 2024-03-12 PROCEDURE — D0367: CPT

## 2024-03-12 NOTE — CONSULT LETTER
[Dear  ___] : Dear  [unfilled], [Courtesy Letter:] : I had the pleasure of seeing your patient, [unfilled], in my office today. [Please see my note below.] : Please see my note below. [Sincerely,] : Sincerely, [FreeTextEntry2] : Nighat Ramirez DDS ([place], [place]) [FreeTextEntry3] : Yonis Hernández MD, FACS\par \par    [facility] [place]\par Associate Chair\par    Department of Otolaryngology\par \par Professor\par Otolaryngology & Molecular Medicine\par [facility] [employer]\par  [place]

## 2024-04-09 ENCOUNTER — APPOINTMENT (OUTPATIENT)
Age: 74
End: 2024-04-09

## 2024-04-09 PROCEDURE — 99213 OFFICE O/P EST LOW 20 MIN: CPT

## 2024-06-11 ENCOUNTER — APPOINTMENT (OUTPATIENT)
Dept: OTOLARYNGOLOGY | Facility: CLINIC | Age: 74
End: 2024-06-11
Payer: MEDICARE

## 2024-06-11 VITALS
BODY MASS INDEX: 24.75 KG/M2 | WEIGHT: 145 LBS | HEIGHT: 64 IN | DIASTOLIC BLOOD PRESSURE: 82 MMHG | SYSTOLIC BLOOD PRESSURE: 139 MMHG

## 2024-06-11 DIAGNOSIS — C03.9 MALIGNANT NEOPLASM OF GUM, UNSPECIFIED: ICD-10-CM

## 2024-06-11 PROCEDURE — 99214 OFFICE O/P EST MOD 30 MIN: CPT

## 2024-06-11 NOTE — HISTORY OF PRESENT ILLNESS
[de-identified] :  Ms. 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  On 6/30/2023, Dr. Dill removed neck lesion - Epidermal hyperplasia and dermal suppurative granulomatous. She is 3 years out of treatment.  Patient states feeling well since last visit. Denies pain, dysphagia, odynophagia, dysphonia and or dyspnea

## 2024-06-11 NOTE — CONSULT LETTER
[Dear  ___] : Dear  [unfilled], [Courtesy Letter:] : I had the pleasure of seeing your patient, [unfilled], in my office today. [Please see my note below.] : Please see my note below. [Sincerely,] : Sincerely, [FreeTextEntry2] : Nighat Ramirez DDS ([place], [place]) [FreeTextEntry3] : Yonis Hernández MD, FACS     [facility] Associate Chair    Department of Otolaryngology  Professor Otolaryngology & Molecular Medicine [facility]

## 2024-07-11 ENCOUNTER — OFFICE (OUTPATIENT)
Dept: URBAN - METROPOLITAN AREA CLINIC 12 | Facility: CLINIC | Age: 74
Setting detail: OPHTHALMOLOGY
End: 2024-07-11
Payer: MEDICARE

## 2024-07-11 DIAGNOSIS — H43.393: ICD-10-CM

## 2024-07-11 DIAGNOSIS — H25.13: ICD-10-CM

## 2024-07-11 DIAGNOSIS — H90.3: ICD-10-CM

## 2024-07-11 DIAGNOSIS — H40.013: ICD-10-CM

## 2024-07-11 PROCEDURE — 92133 CPTRZD OPH DX IMG PST SGM ON: CPT | Performed by: OPHTHALMOLOGY

## 2024-07-11 PROCEDURE — 92567 TYMPANOMETRY: CPT | Mod: AB

## 2024-07-11 PROCEDURE — 92083 EXTENDED VISUAL FIELD XM: CPT | Performed by: OPHTHALMOLOGY

## 2024-07-11 PROCEDURE — 92557 COMPREHENSIVE HEARING TEST: CPT | Mod: AB

## 2024-07-11 PROCEDURE — 99213 OFFICE O/P EST LOW 20 MIN: CPT | Performed by: OPHTHALMOLOGY

## 2024-07-11 ASSESSMENT — CONFRONTATIONAL VISUAL FIELD TEST (CVF)
OS_FINDINGS: FULL
OD_FINDINGS: FULL

## 2024-10-16 ENCOUNTER — APPOINTMENT (OUTPATIENT)
Dept: OTOLARYNGOLOGY | Facility: CLINIC | Age: 74
End: 2024-10-16

## 2024-10-23 ENCOUNTER — APPOINTMENT (OUTPATIENT)
Dept: OTOLARYNGOLOGY | Facility: CLINIC | Age: 74
End: 2024-10-23
Payer: MEDICARE

## 2024-10-23 VITALS
DIASTOLIC BLOOD PRESSURE: 85 MMHG | HEIGHT: 64 IN | WEIGHT: 148 LBS | HEART RATE: 75 BPM | BODY MASS INDEX: 25.27 KG/M2 | OXYGEN SATURATION: 98 % | SYSTOLIC BLOOD PRESSURE: 131 MMHG

## 2024-10-23 DIAGNOSIS — C03.9 MALIGNANT NEOPLASM OF GUM, UNSPECIFIED: ICD-10-CM

## 2024-10-23 PROCEDURE — 99214 OFFICE O/P EST MOD 30 MIN: CPT

## 2024-12-19 ENCOUNTER — NON-APPOINTMENT (OUTPATIENT)
Age: 74
End: 2024-12-19

## 2025-01-02 ENCOUNTER — OFFICE (OUTPATIENT)
Dept: URBAN - METROPOLITAN AREA CLINIC 100 | Facility: CLINIC | Age: 75
Setting detail: OPHTHALMOLOGY
End: 2025-01-02
Payer: MEDICARE

## 2025-01-02 DIAGNOSIS — H52.4: ICD-10-CM

## 2025-01-02 DIAGNOSIS — H40.013: ICD-10-CM

## 2025-01-02 DIAGNOSIS — H43.393: ICD-10-CM

## 2025-01-02 DIAGNOSIS — H25.13: ICD-10-CM

## 2025-01-02 PROCEDURE — 92015 DETERMINE REFRACTIVE STATE: CPT | Performed by: OPHTHALMOLOGY

## 2025-01-02 PROCEDURE — 92014 COMPRE OPH EXAM EST PT 1/>: CPT | Performed by: OPHTHALMOLOGY

## 2025-01-02 PROCEDURE — 92250 FUNDUS PHOTOGRAPHY W/I&R: CPT | Performed by: OPHTHALMOLOGY

## 2025-01-02 ASSESSMENT — REFRACTION_MANIFEST
OS_AXIS: 100
OD_VA1: 20/20
OS_AXIS: 100
OD_VA1: 20/20
OS_SPHERE: +0.75
OD_SPHERE: +0.50
OS_SPHERE: +0.75
OS_CYLINDER: -0.75
OS_VA1: 20/25-
OD_CYLINDER: -0.50
OD_AXIS: 140
OD_SPHERE: +0.50
OS_CYLINDER: -0.75
OD_ADD: +2.50
OS_VA1: 20/25-
OD_CYLINDER: -0.50
OS_ADD: +2.50
OD_AXIS: 140

## 2025-01-02 ASSESSMENT — KERATOMETRY
OD_K2POWER_DIOPTERS: 44.25
OS_K2POWER_DIOPTERS: 4.50
OD_K1POWER_DIOPTERS: 44.00
OD_AXISANGLE_DEGREES: 016
OS_AXISANGLE_DEGREES: 095
OS_K1POWER_DIOPTERS: 44.00
METHOD_AUTO_MANUAL: AUTO

## 2025-01-02 ASSESSMENT — TONOMETRY
OS_IOP_MMHG: 20
OD_IOP_MMHG: 20

## 2025-01-02 ASSESSMENT — PACHYMETRY
OS_CT_UM: 564
OD_CT_CORRECTION: -1
OS_CT_CORRECTION: -1
OD_CT_UM: 557

## 2025-01-02 ASSESSMENT — REFRACTION_CURRENTRX
OD_AXIS: 005
OD_OVR_VA: 20/
OD_CYLINDER: -0.25
OS_CYLINDER: -0.25
OS_OVR_VA: 20/
OD_VPRISM_DIRECTION: SV
OS_ADD: +2.50
OD_SPHERE: +0.75
OS_SPHERE: +1.50
OD_CYLINDER: -0.50
OS_AXIS: 085
OS_OVR_VA: 20/
OD_VPRISM_DIRECTION: BF
OD_ADD: 93
OD_OVR_VA: 20/
OS_CYLINDER: -0.50
OS_SPHERE: +2.75
OD_AXIS: 114
OD_SPHERE: +3.25
OS_AXIS: 089
OS_VPRISM_DIRECTION: SV
OS_VPRISM_DIRECTION: BF

## 2025-01-02 ASSESSMENT — REFRACTION_AUTOREFRACTION
OD_CYLINDER: -0.50
OS_CYLINDER: -0.75
OS_AXIS: 099
OD_SPHERE: +0.25
OD_AXIS: 142
OS_SPHERE: +0.75

## 2025-01-02 ASSESSMENT — VISUAL ACUITY
OS_BCVA: 20/30+2
OD_BCVA: 20/25-2

## 2025-01-02 ASSESSMENT — CONFRONTATIONAL VISUAL FIELD TEST (CVF)
OD_FINDINGS: FULL
OS_FINDINGS: FULL

## 2025-02-26 ENCOUNTER — APPOINTMENT (OUTPATIENT)
Dept: OTOLARYNGOLOGY | Facility: CLINIC | Age: 75
End: 2025-02-26
Payer: MEDICARE

## 2025-02-26 VITALS
OXYGEN SATURATION: 98 % | DIASTOLIC BLOOD PRESSURE: 80 MMHG | HEART RATE: 81 BPM | BODY MASS INDEX: 25.27 KG/M2 | SYSTOLIC BLOOD PRESSURE: 122 MMHG | HEIGHT: 64 IN | WEIGHT: 148 LBS

## 2025-02-26 DIAGNOSIS — C03.9 MALIGNANT NEOPLASM OF GUM, UNSPECIFIED: ICD-10-CM

## 2025-02-26 PROCEDURE — 99214 OFFICE O/P EST MOD 30 MIN: CPT

## 2025-04-17 ENCOUNTER — NON-APPOINTMENT (OUTPATIENT)
Age: 75
End: 2025-04-17

## 2025-04-19 ENCOUNTER — RESULT REVIEW (OUTPATIENT)
Age: 75
End: 2025-04-19

## 2025-04-19 ENCOUNTER — NON-APPOINTMENT (OUTPATIENT)
Age: 75
End: 2025-04-19

## 2025-06-05 NOTE — H&P PST ADULT - BLOOD TRANSFUSION, PREVIOUS, PROFILE
Medication failed protocol.    Medication: Pended  Medication Refill Protocol Failed.  Failed criteria: SEE Below. Sent to clinician to review.   Pharmacy: Pended    Thyroid Hormones Refill Protocol - 12 Month Protocol [id number]/04/2025 05:57 AM   Protocol Details Normal TSH within last 12 months looking at last value - IF FAILED REFER TO PROTOCOL DETAILS          Hmg CoA Reductase Inhibitors (Statin) Refill Protocol - 12 Month Protocol [id number]/04/2025 05:57 AM   Protocol Details Lipid Panel or Direct LDL resulted within last 15 months -- IF CRITERIA FAILED REFER TO PROTOCOL DETAILS          Diuretics Refill Protocol - 12 Month Protocol [id number]/04/2025 05:57 AM   Protocol Details eGFR resulted within last 12 months -- IF CRITERIA FAILED REFER TO PROTOCOL DETAILS    eGFR greater than 29 within last 12 months looking at last value    Potassium resulted within last 12 months is within 10% of normal range looking at last value -- IF CRITERIA FAILED REFER TO PROTOCOL DETAILS    Sodium resulted within last 12 months is within 10% of normal range looking at last value -- IF CRITERIA FAILED REFER TO PROTOCOL DETAILS        Last office visit date: 2-24-25  Next appointment scheduled?: Yes       Orders pended, and routed to provider's nurse pool for review and or approval.   Please route any notes back to your nursing pool.       Thank you, Refill Center Staff     no

## 2025-07-10 ENCOUNTER — OFFICE (OUTPATIENT)
Dept: URBAN - METROPOLITAN AREA CLINIC 100 | Facility: CLINIC | Age: 75
Setting detail: OPHTHALMOLOGY
End: 2025-07-10
Payer: MEDICARE

## 2025-07-10 DIAGNOSIS — H43.393: ICD-10-CM

## 2025-07-10 DIAGNOSIS — H25.13: ICD-10-CM

## 2025-07-10 DIAGNOSIS — H40.013: ICD-10-CM

## 2025-07-10 PROCEDURE — 92133 CPTRZD OPH DX IMG PST SGM ON: CPT | Performed by: OPHTHALMOLOGY

## 2025-07-10 PROCEDURE — 92083 EXTENDED VISUAL FIELD XM: CPT | Performed by: OPHTHALMOLOGY

## 2025-07-10 PROCEDURE — 99213 OFFICE O/P EST LOW 20 MIN: CPT | Performed by: OPHTHALMOLOGY

## 2025-07-10 ASSESSMENT — REFRACTION_CURRENTRX
OS_SPHERE: +2.75
OS_SPHERE: +1.50
OD_OVR_VA: 20/
OD_VPRISM_DIRECTION: BF
OS_CYLINDER: -0.25
OS_AXIS: 085
OD_SPHERE: +3.25
OS_AXIS: 089
OD_VPRISM_DIRECTION: SV
OD_CYLINDER: -0.25
OD_ADD: 93
OD_AXIS: 114
OD_SPHERE: +0.75
OS_VPRISM_DIRECTION: BF
OD_AXIS: 005
OD_CYLINDER: -0.50
OS_OVR_VA: 20/
OS_VPRISM_DIRECTION: SV
OS_ADD: +2.50
OS_OVR_VA: 20/
OD_OVR_VA: 20/
OS_CYLINDER: -0.50

## 2025-07-10 ASSESSMENT — REFRACTION_MANIFEST
OS_CYLINDER: -0.75
OD_VA1: 20/20
OS_SPHERE: +0.75
OS_SPHERE: +0.75
OS_ADD: +2.50
OD_SPHERE: +0.50
OS_AXIS: 100
OD_AXIS: 140
OD_AXIS: 140
OD_CYLINDER: -0.50
OD_CYLINDER: -0.50
OS_CYLINDER: -0.75
OD_ADD: +2.50
OD_VA1: 20/20
OD_SPHERE: +0.50
OS_VA1: 20/25-
OS_AXIS: 100
OS_VA1: 20/25-

## 2025-07-10 ASSESSMENT — VISUAL ACUITY
OD_BCVA: 20/30+2
OS_BCVA: 20/25-1

## 2025-07-10 ASSESSMENT — REFRACTION_AUTOREFRACTION
OD_CYLINDER: -0.50
OD_SPHERE: +0.25
OS_SPHERE: +0.75
OD_AXIS: 111
OS_AXIS: 100
OS_CYLINDER: -0.75

## 2025-07-10 ASSESSMENT — PACHYMETRY
OD_CT_UM: 557
OS_CT_UM: 564
OD_CT_CORRECTION: -1
OS_CT_CORRECTION: -1

## 2025-07-10 ASSESSMENT — KERATOMETRY
OS_K1POWER_DIOPTERS: 44.00
OD_K2POWER_DIOPTERS: 44.00
METHOD_AUTO_MANUAL: AUTO
OS_AXISANGLE_DEGREES: 090
OD_K1POWER_DIOPTERS: 43.75
OD_AXISANGLE_DEGREES: 024
OS_K2POWER_DIOPTERS: 44.00

## 2025-07-10 ASSESSMENT — TONOMETRY
OD_IOP_MMHG: 20
OS_IOP_MMHG: 18

## 2025-07-10 ASSESSMENT — CONFRONTATIONAL VISUAL FIELD TEST (CVF)
OD_FINDINGS: FULL
OS_FINDINGS: FULL

## 2025-07-25 NOTE — H&P PST ADULT - VASCULAR
Health Maintenance       COVID-19 Vaccine (5 - 2024-25 season)  Overdue since 9/1/2024           Following review of the above:  Patient is not proceeding with: COVID-19    Note: Refer to final orders and clinician documentation.       detailed exam

## 2025-08-19 ENCOUNTER — NON-APPOINTMENT (OUTPATIENT)
Age: 75
End: 2025-08-19

## 2025-08-20 ENCOUNTER — APPOINTMENT (OUTPATIENT)
Dept: OTOLARYNGOLOGY | Facility: CLINIC | Age: 75
End: 2025-08-20
Payer: MEDICARE

## 2025-08-20 VITALS
HEART RATE: 89 BPM | BODY MASS INDEX: 24.41 KG/M2 | OXYGEN SATURATION: 100 % | DIASTOLIC BLOOD PRESSURE: 73 MMHG | SYSTOLIC BLOOD PRESSURE: 130 MMHG | WEIGHT: 143 LBS | HEIGHT: 64 IN

## 2025-08-20 DIAGNOSIS — C03.9 MALIGNANT NEOPLASM OF GUM, UNSPECIFIED: ICD-10-CM

## 2025-08-20 PROCEDURE — 99214 OFFICE O/P EST MOD 30 MIN: CPT

## 2025-09-15 ENCOUNTER — NON-APPOINTMENT (OUTPATIENT)
Age: 75
End: 2025-09-15